# Patient Record
Sex: FEMALE | Race: WHITE | NOT HISPANIC OR LATINO | Employment: FULL TIME | URBAN - METROPOLITAN AREA
[De-identification: names, ages, dates, MRNs, and addresses within clinical notes are randomized per-mention and may not be internally consistent; named-entity substitution may affect disease eponyms.]

---

## 2017-02-27 ENCOUNTER — LAB (OUTPATIENT)
Dept: LAB | Facility: HOSPITAL | Age: 52
End: 2017-02-27

## 2017-02-27 ENCOUNTER — HOSPITAL ENCOUNTER (OUTPATIENT)
Dept: GENERAL RADIOLOGY | Facility: HOSPITAL | Age: 52
Discharge: HOME | End: 2017-02-27
Attending: ORTHOPAEDIC SURGERY

## 2017-02-27 ENCOUNTER — HOSPITAL ENCOUNTER (OUTPATIENT)
Dept: GENERAL RADIOLOGY | Facility: HOSPITAL | Age: 52
Discharge: HOME | End: 2017-02-27
Attending: ORTHOPAEDIC SURGERY | Admitting: ORTHOPAEDIC SURGERY

## 2017-02-27 ENCOUNTER — OFFICE VISIT (OUTPATIENT)
Dept: ORTHOPEDIC SURGERY | Facility: CLINIC | Age: 52
End: 2017-02-27

## 2017-02-27 VITALS — BODY MASS INDEX: 33.66 KG/M2 | HEIGHT: 65 IN | WEIGHT: 202 LBS | TEMPERATURE: 97.9 F

## 2017-02-27 DIAGNOSIS — E55.9 VITAMIN D DEFICIENCY: ICD-10-CM

## 2017-02-27 DIAGNOSIS — T85.848S PAIN FROM IMPLANTED HARDWARE, SEQUELA: ICD-10-CM

## 2017-02-27 DIAGNOSIS — M19.079 ARTHRITIS OF FOOT: ICD-10-CM

## 2017-02-27 DIAGNOSIS — Z98.890 HX OF FOOT SURGERY: ICD-10-CM

## 2017-02-27 DIAGNOSIS — M79.672 FOOT PAIN, LEFT: Primary | ICD-10-CM

## 2017-02-27 DIAGNOSIS — S92.355A CLOSED NONDISPLACED FRACTURE OF FIFTH METATARSAL BONE OF LEFT FOOT, INITIAL ENCOUNTER: ICD-10-CM

## 2017-02-27 PROBLEM — T85.848A PAIN FROM IMPLANTED HARDWARE: Status: ACTIVE | Noted: 2017-02-27

## 2017-02-27 LAB — 25(OH)D3 SERPL-MCNC: 42.9 NG/ML (ref 30–100)

## 2017-02-27 PROCEDURE — 36415 COLL VENOUS BLD VENIPUNCTURE: CPT

## 2017-02-27 PROCEDURE — 73630 X-RAY EXAM OF FOOT: CPT

## 2017-02-27 PROCEDURE — 82306 VITAMIN D 25 HYDROXY: CPT

## 2017-02-27 PROCEDURE — 99214 OFFICE O/P EST MOD 30 MIN: CPT | Performed by: ORTHOPAEDIC SURGERY

## 2017-02-27 PROCEDURE — 28470 CLTX METATARSAL FX WO MNP EA: CPT | Performed by: ORTHOPAEDIC SURGERY

## 2017-02-27 RX ORDER — ALENDRONATE SODIUM 70 MG/1
70 TABLET ORAL
COMMUNITY
Start: 2015-03-17

## 2017-02-27 RX ORDER — BUDESONIDE AND FORMOTEROL FUMARATE DIHYDRATE 80; 4.5 UG/1; UG/1
2 AEROSOL RESPIRATORY (INHALATION)
COMMUNITY
Start: 2015-02-22

## 2017-02-27 RX ORDER — ATORVASTATIN CALCIUM 20 MG/1
20 TABLET, FILM COATED ORAL DAILY
COMMUNITY
Start: 2016-03-19

## 2017-02-27 RX ORDER — MONTELUKAST SODIUM 10 MG/1
10 TABLET ORAL NIGHTLY
COMMUNITY

## 2017-02-27 RX ORDER — ESOMEPRAZOLE MAGNESIUM 40 MG/1
40 CAPSULE, DELAYED RELEASE ORAL
COMMUNITY

## 2017-02-27 RX ORDER — NAPROXEN 500 MG/1
500 TABLET ORAL 2 TIMES DAILY WITH MEALS
COMMUNITY
Start: 2017-02-26 | End: 2017-03-17 | Stop reason: HOSPADM

## 2017-02-27 RX ORDER — ALBUTEROL SULFATE 90 UG/1
2 AEROSOL, METERED RESPIRATORY (INHALATION) EVERY 4 HOURS PRN
COMMUNITY

## 2017-02-27 RX ORDER — UBIDECARENONE 75 MG
50 CAPSULE ORAL DAILY
COMMUNITY

## 2017-02-27 NOTE — PROGRESS NOTES
"   New Patient Complaint      Patient: Tanya L Gowers  YOB: 1965 52 y.o. female  Medical Record Number: [id number]    Chief Complaints: My feet hurt    History of Present Illness:        Patient had ORIF of right fifth metatarsal from February 2014.  Since she's never really been able to move the toe well and is was a little bit of numbness to it but is now began to get some occasional intermittent achy pain over the lateral aspect of the right fifth metatarsal.  However her major new complaint is of 5 week history of moderate constant throbbing aching pain over the lateral aspect of her left foot.  No specific injury but she is on her feet quite a bit.  HPI    Allergies: No Known Allergies    Medications:   No current outpatient prescriptions on file prior to visit.     No current facility-administered medications on file prior to visit.        Past Medical History   Diagnosis Date   • Asthma    • OA (osteoarthritis)      Past Surgical History   Procedure Laterality Date   • Hand surgery Right 2014     tendon repair   • Foot surgery Right 2014     Social History     Occupational History   • Not on file.     Social History Main Topics   • Smoking status: Never Smoker   • Smokeless tobacco: Not on file   • Alcohol use No   • Drug use: Not on file   • Sexual activity: Not on file      Social History     Social History Narrative   • No narrative on file     Family History   Problem Relation Age of Onset   • Diabetes Other    • Cancer Other      skin   • Hypertension Other    • Heart disease Other        Review of Systems: 14 point review of systems performed, positive pertinent findings identified in HPI. All remaining systems negative     Review of Systems      Physical Exam:   Vitals:    02/27/17 0900   Temp: 97.9 °F (36.6 °C)   TempSrc: Temporal Artery    Weight: 202 lb (91.6 kg)   Height: 65\" (165.1 cm)     Physical Exam   Constitutional: pleasant, well developed   Eyes: sclera non icteric  Hearing : " adequate for exam  Cardiovascular: palpable pulses in foot, calf/ thigh NT without sign of DVT  Respiratoy: breathing unlabored   Neurological: grossly sensate to LT throughout LE  Psychiatric: oriented with normal mood and affect.   Lymphatic: No palpable popliteal lymphadenopathy  Skin: intact throughout leg/foot  Musculoskeletal: Right foot shows well-healed incision there is some diminished sensation around the lateral incision limited motion to the fifth toe some mild discomfort over the hardware and metatarsal dorsally more so than laterally but no sign of infection or breakdown.  Left foot shows neutral alignment without significant varus.  There is mild swelling and discomfort to palpation along the dorsal lateral aspect of the fifth metatarsal.  But also some discomfort over the dorsolateral midfoot along the base the third and fourth metatarsals.  Physical Exam  Ortho Exam    Radiology: 3 views of the left foot ordered to evaluate foot pain and reviewed there are no prior x-rays available for comparison.  Shows arthritic change mildly to the dorsum of the midfoot.  There is also a nondisplaced fifth metatarsal metaphyseal diaphyseal stress fracture.    3 views of the right foot were ordered to evaluate foot pain reviewed and compared with x-rays from 2014 hardware remains intact and best I can tell fracture remains well-healed is a little questionable area of lucency around the plate distally.    Assessment/Plan: 1.  Probable vitamin D deficiency.  She had a level checked that I could check and Epic several years ago that was at the very low end of normal she takes D2 weekly but is unclear of the dose.  We need to check another vitamin D level and monitor and tailor dose accordingly.    2.  Right fifth metatarsal pain status post ORIF.  Have her use stiff accommodative shoes.  We'll try to get a CT scan to make sure this is healed about may be difficult to tell with the hardware intact.    3.  Left fifth  metatarsal fracture with dorsal midfoot arthritis.  We'll place her into a boot and crutches.  We discussed operative and nonoperative measures she certainly like to try some nonoperative measures which I think is reasonable.  She'll use crutches limit weightbearing will allow seated work only and also any get an MRI to make sure it on something else going on the dorsum of the foot or other stress fractures etc.  I'll see her back in about 2 weeks x-rays of her left foot and right foot if she's having persistent pain.    Also as she was leaving she said that she has been having pain in her right knee.  We'll get an x-ray of her right knee at follow-up.

## 2017-02-28 ENCOUNTER — TELEPHONE (OUTPATIENT)
Dept: ORTHOPEDIC SURGERY | Facility: CLINIC | Age: 52
End: 2017-02-28

## 2017-02-28 DIAGNOSIS — E55.9 VITAMIN D INSUFFICIENCY: Primary | ICD-10-CM

## 2017-02-28 NOTE — TELEPHONE ENCOUNTER
----- Message from Dane Yip MD sent at 2/27/2017 11:53 AM EST -----  Please have her do D3 50k weekly and recheck in 6 weeks  ----- Message -----     From: Lab, Background User     Sent: 2/27/2017  11:37 AM       To: Dane Yip MD

## 2017-02-28 NOTE — TELEPHONE ENCOUNTER
Have spoken with the patient regarding her serum vitamin D level.  It was 42.9.  Patient is presently on vitamin D to 50,000 units once a week that his ordered by her primary care physician.  Have ask her to stay on the same dose and will recheck her serum vitamin D level of the week of April 10 per Dr. Yip

## 2017-03-03 ENCOUNTER — HOSPITAL ENCOUNTER (OUTPATIENT)
Dept: CT IMAGING | Facility: HOSPITAL | Age: 52
Discharge: HOME | End: 2017-03-03
Attending: ORTHOPAEDIC SURGERY | Admitting: ORTHOPAEDIC SURGERY

## 2017-03-03 ENCOUNTER — OFFICE VISIT (OUTPATIENT)
Dept: ORTHOPEDIC SURGERY | Facility: CLINIC | Age: 52
End: 2017-03-03

## 2017-03-03 DIAGNOSIS — T85.848S PAIN FROM IMPLANTED HARDWARE, SEQUELA: ICD-10-CM

## 2017-03-03 DIAGNOSIS — S92.355A CLOSED NONDISPLACED FRACTURE OF FIFTH METATARSAL BONE OF LEFT FOOT, INITIAL ENCOUNTER: ICD-10-CM

## 2017-03-03 PROCEDURE — 73718 MRI LOWER EXTREMITY W/O DYE: CPT | Performed by: ORTHOPAEDIC SURGERY

## 2017-03-03 PROCEDURE — 73700 CT LOWER EXTREMITY W/O DYE: CPT

## 2017-03-13 ENCOUNTER — OFFICE VISIT (OUTPATIENT)
Dept: ORTHOPEDIC SURGERY | Facility: CLINIC | Age: 52
End: 2017-03-13

## 2017-03-13 VITALS — HEIGHT: 65 IN | TEMPERATURE: 97.6 F | BODY MASS INDEX: 33.66 KG/M2 | WEIGHT: 202 LBS

## 2017-03-13 DIAGNOSIS — S92.355G CLOSED NONDISPLACED FRACTURE OF FIFTH METATARSAL BONE OF LEFT FOOT WITH DELAYED HEALING, SUBSEQUENT ENCOUNTER: ICD-10-CM

## 2017-03-13 DIAGNOSIS — M17.11 OSTEOARTHROSIS, LOCALIZED, PRIMARY, KNEE, RIGHT: ICD-10-CM

## 2017-03-13 DIAGNOSIS — T85.848S PAIN FROM IMPLANTED HARDWARE, SEQUELA: Primary | ICD-10-CM

## 2017-03-13 PROCEDURE — 20610 DRAIN/INJ JOINT/BURSA W/O US: CPT | Performed by: ORTHOPAEDIC SURGERY

## 2017-03-13 PROCEDURE — 99214 OFFICE O/P EST MOD 30 MIN: CPT | Performed by: ORTHOPAEDIC SURGERY

## 2017-03-13 PROCEDURE — 73630 X-RAY EXAM OF FOOT: CPT | Performed by: ORTHOPAEDIC SURGERY

## 2017-03-13 PROCEDURE — 73562 X-RAY EXAM OF KNEE 3: CPT | Performed by: ORTHOPAEDIC SURGERY

## 2017-03-13 RX ORDER — BUPIVACAINE HCL/0.9 % NACL/PF 0.1 %
3 PLASTIC BAG, INJECTION (ML) EPIDURAL ONCE
Status: CANCELLED | OUTPATIENT
Start: 2017-03-13 | End: 2017-03-13

## 2017-03-13 RX ORDER — SODIUM CHLORIDE 0.9 % (FLUSH) 0.9 %
1-10 SYRINGE (ML) INJECTION AS NEEDED
Status: CANCELLED | OUTPATIENT
Start: 2017-03-13

## 2017-03-13 RX ADMIN — LIDOCAINE HYDROCHLORIDE 2 ML: 10 INJECTION, SOLUTION INFILTRATION; PERINEURAL at 13:24

## 2017-03-13 RX ADMIN — METHYLPREDNISOLONE ACETATE 80 MG: 80 INJECTION, SUSPENSION INTRA-ARTICULAR; INTRALESIONAL; INTRAMUSCULAR; SOFT TISSUE at 13:24

## 2017-03-13 RX ADMIN — BUPIVACAINE HYDROCHLORIDE 2 ML: 5 INJECTION, SOLUTION PERINEURAL at 13:24

## 2017-03-13 NOTE — PROGRESS NOTES
"Foot Follow Up      Patient: Tanya L Gowers    YOB: 1965 52 y.o. female    Chief Complaints: Foot and knee hurt    History of Present Illness: Follows up about a 6 week history of moderate constant aching throbbing pain over the lateral aspect of her left foot.  Since her last visit she has been in a boot using a cane with minimal relief.  She also complains of intermittent achy pain over the lateral aspect of the right fifth metatarsal where she had ORIF done in February 2015.  She also now reports a 4-5 week history of moderate intermittent throbbing aching pain over the medial aspect of her right knee since she's been putting more weight on it subsequent to her left foot and in.  HPI    ROS: Foot pain him a knee pain, no numbness or tingling  Past Medical History   Diagnosis Date   • Asthma    • OA (osteoarthritis)      Physical Exam:   Vitals:    03/13/17 1408   Temp: 97.6 °F (36.4 °C)   TempSrc: Temporal Artery    Weight: 202 lb (91.6 kg)   Height: 65\" (165.1 cm)     Well developed with normal mood.  Right knee shows mild effusion no warmth erythema.  There is discomfort over the medial joint line but no gross exacerbation by Jonelle's.  0-115° range of motion stable ligamentous exam.  Right foot shows well-healed incision over the lateral aspect of the fifth metatarsal no warmth or erythema minimal discomfort palpation along the prominent hardware.  Left foot shows moderate discomfort palpation along the lateral aspect of the fifth metatarsal with neutral dorsiflexion.  Grossly sensate light touch at the lateral aspect of the left foot      Radiology: 3 views of both feet were ordered today to evaluate foot pain reviewed and compared with x-rays from her last visit.  The left fifth metatarsal metaphyseal diaphyseal stress fracture is more prominent today but not displaced.  Right foot shows hardware to remain intact with no clear evidence of recurrent fracture.  3 views of the right knee were " ordered to evaluate knee pain and reviewed there are no prior x-rays for comparison.  These show moderate arthritic changes medial joint space narrowing no obvious fracture or gross malalignment.    CT scan of the right foot films and report dated 3/3/17 were reviewed shows the plate and screw fixation but no obvious new fracture.  There is mild degenerative change of the dorsum of the second TMT joint    MRI films and report of left foot dated 3/3/17 showed some bone marrow edema and soft tissue edema around nondisplaced fifth metatarsal fracture some degenerative change at the third TMT joint.    Vit D 2/27/17 42.9    Assessment/Plan:  1.  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  Nothing really to do with this point for these may eventually need radiographically guided injections    2.  Vitamin D level is within the normal limit of .  She is currently taking 50,000 units weekly per her PCP and will continue with this    3.  Right foot symptomatic hardware.  This is only mildly bothersome at this time and she does not want to have it removed.  We will monitor this and she'll continue with wide accommodative shoes    4.  Right knee pain most likely symptomatically arthritis from overuse to the left foot condition.  We discussed treatment options and right knee was injected with steroid.  Over this will help settle things down if not may need MRI.    5.  Left fifth metaphyseal diaphyseal metatarsal stress fracture.  We discussed operative and nonoperative treatment options and although no guarantees could be given she like to proceed with operative treatment.  She will need intramedullary screw fixation and may need augmentation with cadaver BMP.  I reviewed the procedure with her in detail and postoperative course with associated risks benefits potential outcomes and complications which can include but are not limited to heart attack stroke death pneumonia infection bleeding damage to blood  vessels and nerves or tendons blood clots pulmonary and wasn't persistent or worsening pain stiffness malunion nonunion need for subsequent surgery and failure of fixation or hardware or fracture distal to the implant. As well as small risk of disease transmission from cadaver graft.  All of her questions are answered to her full satisfaction will schedule this as soon as possible on an outpatient basis.  She will call she has any questions prior to surgery      Large Joint Arthrocentesis  Date/Time: 3/13/2017 1:24 PM  Consent given by: patient  Site marked: site marked  Timeout: Immediately prior to procedure a time out was called to verify the correct patient, procedure, equipment, support staff and site/side marked as required   Supporting Documentation  Indications: pain   Procedure Details  Location: knee - R knee  Needle size: 22 G  Approach: anteromedial  Medications administered: 2 mL lidocaine 1 %; 80 mg methylPREDNISolone acetate 80 MG/ML; 2 mL bupivacaine  Patient tolerance: patient tolerated the procedure well with no immediate complications

## 2017-03-14 ENCOUNTER — PREP FOR SURGERY (OUTPATIENT)
Dept: ORTHOPEDIC SURGERY | Facility: CLINIC | Age: 52
End: 2017-03-14

## 2017-03-14 ENCOUNTER — APPOINTMENT (OUTPATIENT)
Dept: PREADMISSION TESTING | Facility: HOSPITAL | Age: 52
End: 2017-03-14

## 2017-03-14 ENCOUNTER — TELEPHONE (OUTPATIENT)
Dept: ORTHOPEDIC SURGERY | Facility: CLINIC | Age: 52
End: 2017-03-14

## 2017-03-14 VITALS
WEIGHT: 204 LBS | OXYGEN SATURATION: 94 % | TEMPERATURE: 97.8 F | RESPIRATION RATE: 16 BRPM | HEART RATE: 89 BPM | HEIGHT: 65 IN | SYSTOLIC BLOOD PRESSURE: 153 MMHG | DIASTOLIC BLOOD PRESSURE: 90 MMHG | BODY MASS INDEX: 33.99 KG/M2

## 2017-03-14 DIAGNOSIS — D72.829 LEUKOCYTOSIS, UNSPECIFIED TYPE: Primary | ICD-10-CM

## 2017-03-14 LAB
ANION GAP SERPL CALCULATED.3IONS-SCNC: 19.4 MMOL/L
BUN BLD-MCNC: 13 MG/DL (ref 6–20)
BUN/CREAT SERPL: 19.7 (ref 7–25)
CALCIUM SPEC-SCNC: 9.9 MG/DL (ref 8.6–10.5)
CHLORIDE SERPL-SCNC: 99 MMOL/L (ref 98–107)
CO2 SERPL-SCNC: 21.6 MMOL/L (ref 22–29)
CREAT BLD-MCNC: 0.66 MG/DL (ref 0.57–1)
DEPRECATED RDW RBC AUTO: 41.9 FL (ref 37–54)
ERYTHROCYTE [DISTWIDTH] IN BLOOD BY AUTOMATED COUNT: 13.7 % (ref 11.7–13)
GFR SERPL CREATININE-BSD FRML MDRD: 93 ML/MIN/1.73
GLUCOSE BLD-MCNC: 133 MG/DL (ref 65–99)
HCT VFR BLD AUTO: 39.9 % (ref 35.6–45.5)
HGB BLD-MCNC: 13.6 G/DL (ref 11.9–15.5)
MCH RBC QN AUTO: 28.8 PG (ref 26.9–32)
MCHC RBC AUTO-ENTMCNC: 34.1 G/DL (ref 32.4–36.3)
MCV RBC AUTO: 84.5 FL (ref 80.5–98.2)
PLATELET # BLD AUTO: 237 10*3/MM3 (ref 140–500)
PMV BLD AUTO: 11.1 FL (ref 6–12)
POTASSIUM BLD-SCNC: 3.8 MMOL/L (ref 3.5–5.2)
RBC # BLD AUTO: 4.72 10*6/MM3 (ref 3.9–5.2)
SODIUM BLD-SCNC: 140 MMOL/L (ref 136–145)
WBC NRBC COR # BLD: 22.72 10*3/MM3 (ref 4.5–10.7)

## 2017-03-14 PROCEDURE — 93010 ELECTROCARDIOGRAM REPORT: CPT | Performed by: INTERNAL MEDICINE

## 2017-03-14 PROCEDURE — 36415 COLL VENOUS BLD VENIPUNCTURE: CPT

## 2017-03-14 PROCEDURE — 93005 ELECTROCARDIOGRAM TRACING: CPT

## 2017-03-14 PROCEDURE — 85027 COMPLETE CBC AUTOMATED: CPT | Performed by: ORTHOPAEDIC SURGERY

## 2017-03-14 PROCEDURE — 80048 BASIC METABOLIC PNL TOTAL CA: CPT | Performed by: ORTHOPAEDIC SURGERY

## 2017-03-14 RX ORDER — ERGOCALCIFEROL 1.25 MG/1
50000 CAPSULE ORAL WEEKLY
COMMUNITY

## 2017-03-14 RX ORDER — FLUTICASONE PROPIONATE 50 MCG
2 SPRAY, SUSPENSION (ML) NASAL DAILY PRN
COMMUNITY

## 2017-03-14 NOTE — DISCHARGE INSTRUCTIONS
Take the following medications the morning of surgery with a small sip of water.  NEXIUM, SYMBICORT    Arrive to hospital on your day of surgery at 7:30 am    General Instructions:  • Do not eat or drink after midnight: includes water, mints, or gum. You may brush your teeth.  • Do not smoke, chew tobacco, or drink alcohol.  • The Pre-Admission Testing nurse will instruct you to bring medications if unable to obtain an accurate list in Pre-Admission Testing.    • If applicable bring your C-PAP/ BI-PAP machine.  • Bring any papers given to you in the doctor’s office.  • Wear clean comfortable clothes and socks.  • Do not wear contact lenses or make-up.  Bring a case for your glasses if applicable.   • Bring crutches or walker if applicable.  • Leave all other valuables and jewelry at home.    If you were given a blood bank ID arm band remember to bring it with you the day of surgery.    Preventing a Surgical Site Infection:  Shower on the morning of surgery using a fresh bar of anti-bacterial soap (such as Dial) and clean washcloth.  Dry with a clean towel and dress in clean clothing.  For 2 to 3 days before surgery, avoid shaving with a razor near where you will have surgery because the razor can irritate skin and make it easier to develop an infection  Ask your surgeon if you will be receiving antibiotics prior to surgery  Make sure you, your family, and all healthcare providers clean their hands with soap and water or an alcohol based hand  before caring for you or your wound  If at all possible, quit smoking as many days before surgery as you can.    Day of surgery:  Upon arrival, a Pre-op nurse and Anesthesiologist will review your health history, obtain vital signs, and answer questions you may have.  The only belongings needed at this time will be your home medications and if applicable your C-PAP/BI-PAP machine.  If you are staying overnight your family can leave the rest of your belongings in the  car and bring them to your room later.  A Pre-op nurse will start an IV and you may receive medication in preparation for surgery, including something to help you relax.  Your family will be able to see you in the Pre-op area.  While you are in surgery your family should notify the waiting room  if they leave the waiting room area and provide a contact phone number.    Please be aware that surgery does come with discomfort.  We want to make every effort to control your discomfort so please discuss any uncontrolled symptoms with your nurse.   Your doctor will most likely have prescribed pain medications.      If you are going home after surgery you will receive individualized written care instructions before being discharged.  A responsible adult must drive you to and from the hospital on the day of your surgery and stay with you for 24 hours.    If you are staying overnight following surgery, you will be transported to your hospital room following the recovery period.  [facility] has all private rooms.    If you have any questions please call Pre-Admission Testing at [phone].  Deductibles and co-payments are collected on the day of service. Please be prepared to pay the required co-pay, deductible or deposit on the day of service as defined by your plan.

## 2017-03-16 NOTE — H&P
"      Patient: Tanya L Gowers     YOB: 1965 52 y.o. female     Chief Complaints: Foot and knee hurt     History of Present Illness: Follows up about a 6 week history of moderate constant aching throbbing pain over the lateral aspect of her left foot. Since her last visit she has been in a boot using a cane with minimal relief. She also complains of intermittent achy pain over the lateral aspect of the right fifth metatarsal where she had ORIF done in February 2015. She also now reports a 4-5 week history of moderate intermittent throbbing aching pain over the medial aspect of her right knee since she's been putting more weight on it subsequent to her left foot and in.  HPI     ROS: Foot pain him a knee pain, no numbness or tingling   Medical History         Past Medical History   Diagnosis Date   • Asthma     • OA (osteoarthritis)           Physical Exam:    Vitals        Vitals:     03/13/17 1408   Temp: 97.6 °F (36.4 °C)   TempSrc: Temporal Artery    Weight: 202 lb (91.6 kg)   Height: 65\" (165.1 cm)         Well developed with normal mood. Right knee shows mild effusion no warmth erythema. There is discomfort over the medial joint line but no gross exacerbation by Jonelle's. 0-115° range of motion stable ligamentous exam. Right foot shows well-healed incision over the lateral aspect of the fifth metatarsal no warmth or erythema minimal discomfort palpation along the prominent hardware. Left foot shows moderate discomfort palpation along the lateral aspect of the fifth metatarsal with neutral dorsiflexion. Grossly sensate light touch at the lateral aspect of the left foot        Radiology: 3 views of both feet were ordered today to evaluate foot pain reviewed and compared with x-rays from her last visit. The left fifth metatarsal metaphyseal diaphyseal stress fracture is more prominent today but not displaced. Right foot shows hardware to remain intact with no clear evidence of recurrent fracture. " Right knee shows moderate arthritic changes medial joint space narrowing no obvious fracture or gross malalignment.     CT scan of the right foot films and report dated 3/3/17 were reviewed shows the plate and screw fixation but no obvious new fracture. There is mild degenerative change of the dorsum of the second TMT joint     MRI films and report of left foot dated 3/3/17 showed some bone marrow edema and soft tissue edema around nondisplaced fifth metatarsal fracture some degenerative change at the third TMT joint.     Vit D 2/27/17 42.9     Assessment/Plan: 1. 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 Nothing really to do with this point for these may eventually need radiographically guided injections     2.  Vitamin D level is within the normal limit of . She is currently taking 50,000 units weekly per her PCP and will continue with this     3. Right foot symptomatic hardware. This is only mildly bothersome at this time and she does not want to have it removed. We will monitor this and she'll continue with wide accommodative shoes     4. Right knee pain most likely symptomatically arthritis from overuse to the left foot condition. We discussed treatment options and right knee was injected with steroid. Over this will help settle things down if not may need MRI.     5. Left fifth metaphyseal diaphyseal metatarsal stress fracture. We discussed operative and nonoperative treatment options and although no guarantees could be given she like to proceed with operative treatment. She will need intramedullary screw fixation and may need augmentation with cadaver BMP. I reviewed the procedure with her in detail and postoperative course with associated risks benefits potential outcomes and complications which can include but are not limited to heart attack stroke death pneumonia infection bleeding damage to blood vessels and nerves or tendons blood clots pulmonary and wasn't persistent or worsening  pain stiffness malunion nonunion need for subsequent surgery and failure of fixation or hardware or fracture distal to the implant. As well as small risk of disease transmission from cadaver graft.  All of her questions are answered to her full satisfaction will schedule this as soon as possible on an outpatient basis. She will call she has any questions prior to surgery

## 2017-03-17 ENCOUNTER — HOSPITAL ENCOUNTER (OUTPATIENT)
Facility: HOSPITAL | Age: 52
Setting detail: HOSPITAL OUTPATIENT SURGERY
Discharge: HOME | End: 2017-03-17
Attending: ORTHOPAEDIC SURGERY | Admitting: ORTHOPAEDIC SURGERY

## 2017-03-17 ENCOUNTER — ANESTHESIA EVENT (OUTPATIENT)
Dept: PERIOP | Facility: HOSPITAL | Age: 52
End: 2017-03-17

## 2017-03-17 ENCOUNTER — ANESTHESIA (OUTPATIENT)
Dept: PERIOP | Facility: HOSPITAL | Age: 52
End: 2017-03-17

## 2017-03-17 ENCOUNTER — APPOINTMENT (OUTPATIENT)
Dept: GENERAL RADIOLOGY | Facility: HOSPITAL | Age: 52
End: 2017-03-17

## 2017-03-17 VITALS
HEART RATE: 75 BPM | DIASTOLIC BLOOD PRESSURE: 87 MMHG | OXYGEN SATURATION: 95 % | RESPIRATION RATE: 16 BRPM | SYSTOLIC BLOOD PRESSURE: 155 MMHG | TEMPERATURE: 99 F

## 2017-03-17 DIAGNOSIS — D72.829 LEUKOCYTOSIS, UNSPECIFIED TYPE: ICD-10-CM

## 2017-03-17 DIAGNOSIS — S92.355G CLOSED NONDISPLACED FRACTURE OF FIFTH METATARSAL BONE OF LEFT FOOT WITH DELAYED HEALING, SUBSEQUENT ENCOUNTER: ICD-10-CM

## 2017-03-17 LAB
BASOPHILS # BLD AUTO: 0.04 10*3/MM3 (ref 0–0.2)
BASOPHILS NFR BLD AUTO: 0.4 % (ref 0–1.5)
DEPRECATED RDW RBC AUTO: 43.9 FL (ref 37–54)
EOSINOPHIL # BLD AUTO: 0.1 10*3/MM3 (ref 0–0.7)
EOSINOPHIL NFR BLD AUTO: 1.1 % (ref 0.3–6.2)
ERYTHROCYTE [DISTWIDTH] IN BLOOD BY AUTOMATED COUNT: 13.9 % (ref 11.7–13)
HCT VFR BLD AUTO: 43 % (ref 35.6–45.5)
HGB BLD-MCNC: 14 G/DL (ref 11.9–15.5)
IMM GRANULOCYTES # BLD: 0.03 10*3/MM3 (ref 0–0.03)
IMM GRANULOCYTES NFR BLD: 0.3 % (ref 0–0.5)
LYMPHOCYTES # BLD AUTO: 3.14 10*3/MM3 (ref 0.9–4.8)
LYMPHOCYTES NFR BLD AUTO: 34.8 % (ref 19.6–45.3)
MCH RBC QN AUTO: 28.2 PG (ref 26.9–32)
MCHC RBC AUTO-ENTMCNC: 32.6 G/DL (ref 32.4–36.3)
MCV RBC AUTO: 86.5 FL (ref 80.5–98.2)
MONOCYTES # BLD AUTO: 0.55 10*3/MM3 (ref 0.2–1.2)
MONOCYTES NFR BLD AUTO: 6.1 % (ref 5–12)
NEUTROPHILS # BLD AUTO: 5.17 10*3/MM3 (ref 1.9–8.1)
NEUTROPHILS NFR BLD AUTO: 57.3 % (ref 42.7–76)
PLATELET # BLD AUTO: 239 10*3/MM3 (ref 140–500)
PMV BLD AUTO: 10.8 FL (ref 6–12)
RBC # BLD AUTO: 4.97 10*6/MM3 (ref 3.9–5.2)
WBC NRBC COR # BLD: 9.03 10*3/MM3 (ref 4.5–10.7)

## 2017-03-17 PROCEDURE — 25010000002 ROPIVACAINE PER 1 MG: Performed by: ANESTHESIOLOGY

## 2017-03-17 PROCEDURE — 28322 REPAIR OF METATARSALS: CPT | Performed by: ORTHOPAEDIC SURGERY

## 2017-03-17 PROCEDURE — 73630 X-RAY EXAM OF FOOT: CPT

## 2017-03-17 PROCEDURE — C1713 ANCHOR/SCREW BN/BN,TIS/BN: HCPCS | Performed by: ORTHOPAEDIC SURGERY

## 2017-03-17 PROCEDURE — 76000 FLUOROSCOPY <1 HR PHYS/QHP: CPT

## 2017-03-17 PROCEDURE — 25010000002 MIDAZOLAM PER 1 MG: Performed by: ANESTHESIOLOGY

## 2017-03-17 PROCEDURE — 25010000002 PROPOFOL 10 MG/ML EMULSION: Performed by: NURSE ANESTHETIST, CERTIFIED REGISTERED

## 2017-03-17 PROCEDURE — 25010000002 FENTANYL CITRATE (PF) 100 MCG/2ML SOLUTION: Performed by: ANESTHESIOLOGY

## 2017-03-17 PROCEDURE — 85025 COMPLETE CBC W/AUTO DIFF WBC: CPT | Performed by: ORTHOPAEDIC SURGERY

## 2017-03-17 PROCEDURE — 25010000002 PROMETHAZINE PER 50 MG: Performed by: ANESTHESIOLOGY

## 2017-03-17 PROCEDURE — 25010000002 ONDANSETRON PER 1 MG: Performed by: NURSE ANESTHETIST, CERTIFIED REGISTERED

## 2017-03-17 PROCEDURE — 25010000002 DEXAMETHASONE PER 1 MG: Performed by: ANESTHESIOLOGY

## 2017-03-17 DEVICE — IMPLANTABLE DEVICE: Type: IMPLANTABLE DEVICE | Site: FOOT | Status: FUNCTIONAL

## 2017-03-17 RX ORDER — DIPHENHYDRAMINE HYDROCHLORIDE 50 MG/ML
6.25 INJECTION INTRAMUSCULAR; INTRAVENOUS
Status: DISCONTINUED | OUTPATIENT
Start: 2017-03-17 | End: 2017-03-17 | Stop reason: HOSPADM

## 2017-03-17 RX ORDER — MIDAZOLAM HYDROCHLORIDE 1 MG/ML
2 INJECTION INTRAMUSCULAR; INTRAVENOUS
Status: DISCONTINUED | OUTPATIENT
Start: 2017-03-17 | End: 2017-03-17 | Stop reason: HOSPADM

## 2017-03-17 RX ORDER — OXYCODONE HYDROCHLORIDE AND ACETAMINOPHEN 5; 325 MG/1; MG/1
1 TABLET ORAL ONCE AS NEEDED
Status: DISCONTINUED | OUTPATIENT
Start: 2017-03-17 | End: 2017-03-17 | Stop reason: HOSPADM

## 2017-03-17 RX ORDER — ONDANSETRON 2 MG/ML
4 INJECTION INTRAMUSCULAR; INTRAVENOUS ONCE AS NEEDED
Status: DISCONTINUED | OUTPATIENT
Start: 2017-03-17 | End: 2017-03-17 | Stop reason: HOSPADM

## 2017-03-17 RX ORDER — CEPHALEXIN 500 MG/1
500 CAPSULE ORAL EVERY 6 HOURS
Qty: 8 CAPSULE | Refills: 0 | Status: SHIPPED | OUTPATIENT
Start: 2017-03-17 | End: 2017-03-19

## 2017-03-17 RX ORDER — FAMOTIDINE 10 MG/ML
20 INJECTION, SOLUTION INTRAVENOUS ONCE
Status: COMPLETED | OUTPATIENT
Start: 2017-03-17 | End: 2017-03-17

## 2017-03-17 RX ORDER — FENTANYL CITRATE 50 UG/ML
100 INJECTION, SOLUTION INTRAMUSCULAR; INTRAVENOUS
Status: DISCONTINUED | OUTPATIENT
Start: 2017-03-17 | End: 2017-03-17 | Stop reason: HOSPADM

## 2017-03-17 RX ORDER — ROPIVACAINE HYDROCHLORIDE 5 MG/ML
INJECTION, SOLUTION EPIDURAL; INFILTRATION; PERINEURAL AS NEEDED
Status: DISCONTINUED | OUTPATIENT
Start: 2017-03-17 | End: 2017-03-17 | Stop reason: SURG

## 2017-03-17 RX ORDER — PROMETHAZINE HYDROCHLORIDE 25 MG/ML
6.25 INJECTION, SOLUTION INTRAMUSCULAR; INTRAVENOUS ONCE
Status: COMPLETED | OUTPATIENT
Start: 2017-03-17 | End: 2017-03-17

## 2017-03-17 RX ORDER — PROPOFOL 10 MG/ML
VIAL (ML) INTRAVENOUS AS NEEDED
Status: DISCONTINUED | OUTPATIENT
Start: 2017-03-17 | End: 2017-03-17 | Stop reason: SURG

## 2017-03-17 RX ORDER — HYDRALAZINE HYDROCHLORIDE 20 MG/ML
5 INJECTION INTRAMUSCULAR; INTRAVENOUS
Status: DISCONTINUED | OUTPATIENT
Start: 2017-03-17 | End: 2017-03-17 | Stop reason: HOSPADM

## 2017-03-17 RX ORDER — FENTANYL CITRATE 50 UG/ML
50 INJECTION, SOLUTION INTRAMUSCULAR; INTRAVENOUS
Status: DISCONTINUED | OUTPATIENT
Start: 2017-03-17 | End: 2017-03-17 | Stop reason: HOSPADM

## 2017-03-17 RX ORDER — SODIUM CHLORIDE, SODIUM LACTATE, POTASSIUM CHLORIDE, CALCIUM CHLORIDE 600; 310; 30; 20 MG/100ML; MG/100ML; MG/100ML; MG/100ML
9 INJECTION, SOLUTION INTRAVENOUS CONTINUOUS
Status: DISCONTINUED | OUTPATIENT
Start: 2017-03-17 | End: 2017-03-17 | Stop reason: HOSPADM

## 2017-03-17 RX ORDER — HYDROCODONE BITARTRATE AND ACETAMINOPHEN 5; 325 MG/1; MG/1
1 TABLET ORAL ONCE AS NEEDED
Status: DISCONTINUED | OUTPATIENT
Start: 2017-03-17 | End: 2017-03-17 | Stop reason: HOSPADM

## 2017-03-17 RX ORDER — CEFAZOLIN SODIUM IN 0.9 % NACL 3 G/100 ML
INTRAVENOUS SOLUTION, PIGGYBACK (ML) INTRAVENOUS
Status: DISCONTINUED
Start: 2017-03-17 | End: 2017-03-17 | Stop reason: HOSPADM

## 2017-03-17 RX ORDER — BUPIVACAINE HCL/0.9 % NACL/PF 0.1 %
3 PLASTIC BAG, INJECTION (ML) EPIDURAL ONCE
Status: COMPLETED | OUTPATIENT
Start: 2017-03-17 | End: 2017-03-17

## 2017-03-17 RX ORDER — ONDANSETRON 2 MG/ML
INJECTION INTRAMUSCULAR; INTRAVENOUS AS NEEDED
Status: DISCONTINUED | OUTPATIENT
Start: 2017-03-17 | End: 2017-03-17 | Stop reason: SURG

## 2017-03-17 RX ORDER — LIDOCAINE HYDROCHLORIDE 10 MG/ML
5 INJECTION, SOLUTION EPIDURAL; INFILTRATION; INTRACAUDAL; PERINEURAL ONCE AS NEEDED
Status: DISCONTINUED | OUTPATIENT
Start: 2017-03-17 | End: 2017-03-17 | Stop reason: HOSPADM

## 2017-03-17 RX ORDER — CEFAZOLIN SODIUM 2 G/100ML
INJECTION, SOLUTION INTRAVENOUS
Status: DISCONTINUED
Start: 2017-03-17 | End: 2017-03-17 | Stop reason: WASHOUT

## 2017-03-17 RX ORDER — SCOLOPAMINE TRANSDERMAL SYSTEM 1 MG/1
1 PATCH, EXTENDED RELEASE TRANSDERMAL ONCE
Status: DISCONTINUED | OUTPATIENT
Start: 2017-03-17 | End: 2017-03-17 | Stop reason: HOSPADM

## 2017-03-17 RX ORDER — SODIUM CHLORIDE 9 MG/ML
INJECTION, SOLUTION INTRAVENOUS CONTINUOUS PRN
Status: DISCONTINUED | OUTPATIENT
Start: 2017-03-17 | End: 2017-03-17 | Stop reason: SURG

## 2017-03-17 RX ORDER — OXYCODONE HYDROCHLORIDE AND ACETAMINOPHEN 5; 325 MG/1; MG/1
1-2 TABLET ORAL EVERY 4 HOURS PRN
Qty: 80 TABLET | Refills: 0 | Status: SHIPPED | OUTPATIENT
Start: 2017-03-17 | End: 2017-10-26

## 2017-03-17 RX ORDER — OXYCODONE HYDROCHLORIDE AND ACETAMINOPHEN 5; 325 MG/1; MG/1
2 TABLET ORAL ONCE AS NEEDED
Status: DISCONTINUED | OUTPATIENT
Start: 2017-03-17 | End: 2017-03-17 | Stop reason: HOSPADM

## 2017-03-17 RX ORDER — LIDOCAINE HYDROCHLORIDE 20 MG/ML
INJECTION, SOLUTION INFILTRATION; PERINEURAL AS NEEDED
Status: DISCONTINUED | OUTPATIENT
Start: 2017-03-17 | End: 2017-03-17 | Stop reason: SURG

## 2017-03-17 RX ORDER — CEFAZOLIN SODIUM IN 0.9 % NACL 3 G/100 ML
3 INTRAVENOUS SOLUTION, PIGGYBACK (ML) INTRAVENOUS ONCE
Status: DISCONTINUED | OUTPATIENT
Start: 2017-03-17 | End: 2017-03-17 | Stop reason: HOSPADM

## 2017-03-17 RX ORDER — HYDROMORPHONE HYDROCHLORIDE 1 MG/ML
0.25 INJECTION, SOLUTION INTRAMUSCULAR; INTRAVENOUS; SUBCUTANEOUS
Status: DISCONTINUED | OUTPATIENT
Start: 2017-03-17 | End: 2017-03-17 | Stop reason: HOSPADM

## 2017-03-17 RX ORDER — MAGNESIUM HYDROXIDE 1200 MG/15ML
LIQUID ORAL AS NEEDED
Status: DISCONTINUED | OUTPATIENT
Start: 2017-03-17 | End: 2017-03-17 | Stop reason: HOSPADM

## 2017-03-17 RX ORDER — SODIUM CHLORIDE 0.9 % (FLUSH) 0.9 %
1-10 SYRINGE (ML) INJECTION AS NEEDED
Status: DISCONTINUED | OUTPATIENT
Start: 2017-03-17 | End: 2017-03-17 | Stop reason: HOSPADM

## 2017-03-17 RX ORDER — LABETALOL HYDROCHLORIDE 5 MG/ML
5 INJECTION, SOLUTION INTRAVENOUS
Status: DISCONTINUED | OUTPATIENT
Start: 2017-03-17 | End: 2017-03-17 | Stop reason: HOSPADM

## 2017-03-17 RX ORDER — DEXAMETHASONE SODIUM PHOSPHATE 4 MG/ML
INJECTION, SOLUTION INTRA-ARTICULAR; INTRALESIONAL; INTRAMUSCULAR; INTRAVENOUS; SOFT TISSUE AS NEEDED
Status: DISCONTINUED | OUTPATIENT
Start: 2017-03-17 | End: 2017-03-17 | Stop reason: SURG

## 2017-03-17 RX ORDER — MIDAZOLAM HYDROCHLORIDE 1 MG/ML
1 INJECTION INTRAMUSCULAR; INTRAVENOUS
Status: DISCONTINUED | OUTPATIENT
Start: 2017-03-17 | End: 2017-03-17 | Stop reason: HOSPADM

## 2017-03-17 RX ORDER — ACETAMINOPHEN 650 MG
TABLET, EXTENDED RELEASE ORAL AS NEEDED
Status: DISCONTINUED | OUTPATIENT
Start: 2017-03-17 | End: 2017-03-17 | Stop reason: HOSPADM

## 2017-03-17 RX ADMIN — SODIUM CHLORIDE: 9 INJECTION, SOLUTION INTRAVENOUS at 10:43

## 2017-03-17 RX ADMIN — PROMETHAZINE HYDROCHLORIDE 6.25 MG: 25 INJECTION, SOLUTION INTRAMUSCULAR; INTRAVENOUS at 08:39

## 2017-03-17 RX ADMIN — ROPIVACAINE HYDROCHLORIDE 25 ML: 5 INJECTION, SOLUTION EPIDURAL; INFILTRATION; PERINEURAL at 08:56

## 2017-03-17 RX ADMIN — LIDOCAINE HYDROCHLORIDE 40 MG: 20 INJECTION, SOLUTION INFILTRATION; PERINEURAL at 09:46

## 2017-03-17 RX ADMIN — PROPOFOL 250 MG: 10 INJECTION, EMULSION INTRAVENOUS at 09:46

## 2017-03-17 RX ADMIN — CEFAZOLIN 3 G: 1 INJECTION, POWDER, FOR SOLUTION INTRAMUSCULAR; INTRAVENOUS; PARENTERAL at 09:46

## 2017-03-17 RX ADMIN — FENTANYL CITRATE 25 MCG: 50 INJECTION INTRAMUSCULAR; INTRAVENOUS at 09:51

## 2017-03-17 RX ADMIN — SODIUM CHLORIDE, POTASSIUM CHLORIDE, SODIUM LACTATE AND CALCIUM CHLORIDE 9 ML/HR: 600; 310; 30; 20 INJECTION, SOLUTION INTRAVENOUS at 11:15

## 2017-03-17 RX ADMIN — DEXAMETHASONE SODIUM PHOSPHATE 4 MG: 4 INJECTION, SOLUTION INTRAMUSCULAR; INTRAVENOUS at 08:56

## 2017-03-17 RX ADMIN — ONDANSETRON 4 MG: 2 INJECTION INTRAMUSCULAR; INTRAVENOUS at 09:53

## 2017-03-17 RX ADMIN — FAMOTIDINE 20 MG: 10 INJECTION, SOLUTION INTRAVENOUS at 08:39

## 2017-03-17 RX ADMIN — FENTANYL CITRATE 50 MCG: 50 INJECTION INTRAMUSCULAR; INTRAVENOUS at 08:43

## 2017-03-17 RX ADMIN — SODIUM CHLORIDE, POTASSIUM CHLORIDE, SODIUM LACTATE AND CALCIUM CHLORIDE 9 ML/HR: 600; 310; 30; 20 INJECTION, SOLUTION INTRAVENOUS at 08:38

## 2017-03-17 RX ADMIN — MIDAZOLAM 2 MG: 1 INJECTION INTRAMUSCULAR; INTRAVENOUS at 08:43

## 2017-03-17 RX ADMIN — SCOPALAMINE 1 PATCH: 1 PATCH, EXTENDED RELEASE TRANSDERMAL at 08:38

## 2017-03-17 NOTE — ANESTHESIA PROCEDURE NOTES
Airway  Urgency: elective    Airway not difficult    General Information and Staff    Patient location during procedure: OR  Anesthesiologist: JAZMYNE ADAMS  CRNA: FABBY BYRD    Indications and Patient Condition  Indications for airway management: airway protection    Preoxygenated: yes  Mask difficulty assessment: 1 - vent by mask    Final Airway Details  Final airway type: supraglottic airway      Successful airway: unique  Size 4    Number of attempts at approach: 1    Additional Comments  Smooth IV/mask induction and placement of LMA. Atraumatic, lips/teeth/mouth intact, as preop. +ETCO2, bilateral breath sounds and equal.

## 2017-03-17 NOTE — ANESTHESIA PROCEDURE NOTES
Peripheral Block    Patient location during procedure: holding area  Start time: 3/17/2017 8:42 AM  Stop time: 3/17/2017 8:56 AM  Reason for block: at surgeon's request and post-op pain management  Performed by  Anesthesiologist: JAZMYNE ADAMS  Preanesthetic Checklist  Completed: patient identified, site marked, surgical consent, pre-op evaluation, timeout performed, IV checked, risks and benefits discussed and monitors and equipment checked  Peripheral Block Prep:  Sterile barriers:cap, gloves, mask and sterile barriers  Prep: ChloraPrep  Patient monitoring: blood pressure monitoring, continuous pulse oximetry and EKG  Peripheral Procedure  Sedation:yes  Guidance:ultrasound guided  Images:still images obtained    Laterality:left  Block Type:adductor canal block, popliteal and sciatic  Injection Technique:single-shot  Needle Type:echogenic  Needle Gauge:22 G  ULTRASOUND INTERPRETATION.  Using ultrasound guidance a 22 G gauge needle was placed in close proximity to the femoral and sciatic nerve, at which point, under ultrasound guidance anesthetic was injected in the area of the nerve and spread of the anesthesia was seen on ultrasound in close proximity thereto.  There were no abnormalities seen on ultrasound; a digital image was taken; and the patient tolerated the procedure with no complications.   Medications  Local Injected:ropivacaine 0.5% without epinephrine Local Amount Injected:30mL  Post Assessment  Injection Assessment: negative aspiration for heme, no paresthesia on injection and incremental injection  Patient Tolerance:comfortable throughout block  Complications:no  Additional Notes  Sciatic - rop 15cc /  dex 4mg  Fem ( add canal ) - rop 10cc

## 2017-03-17 NOTE — INTERVAL H&P NOTE
H&P updated. The patient was examined and no clinical sign of infection. WBC improved and was likely due to steroid injection. 3mm abrasion left anterior shin from boot. appeares healed, no redness

## 2017-03-17 NOTE — PLAN OF CARE
Problem: Patient Care Overview (Adult)  Goal: Plan of Care Review  Outcome: Ongoing (interventions implemented as appropriate)    03/17/17 0855   Coping/Psychosocial Response Interventions   Plan Of Care Reviewed With patient   Patient Care Overview   Progress improving       Goal: Adult Individualization and Mutuality  Outcome: Ongoing (interventions implemented as appropriate)  Goal: Discharge Needs Assessment  Outcome: Ongoing (interventions implemented as appropriate)    03/17/17 0855   Discharge Needs Assessment   Concerns To Be Addressed no discharge needs identified

## 2017-03-17 NOTE — PLAN OF CARE
Problem: Perioperative Period (Adult)  Goal: Signs and Symptoms of Listed Potential Problems Will be Absent or Manageable (Perioperative Period)  Outcome: Ongoing (interventions implemented as appropriate)    03/17/17 1118   Perioperative Period   Problems Assessed (Perioperative Period) all   Problems Present (Perioperative Period) none

## 2017-03-17 NOTE — PLAN OF CARE
Problem: Patient Care Overview (Adult)  Goal: Plan of Care Review  Outcome: Ongoing (interventions implemented as appropriate)    03/17/17 1118   Coping/Psychosocial Response Interventions   Plan Of Care Reviewed With patient   Patient Care Overview   Progress improving   Outcome Evaluation   Outcome Summary/Follow up Plan Vital signs stable, denies pain, tking po fluids       Goal: Adult Individualization and Mutuality  Outcome: Ongoing (interventions implemented as appropriate)  Goal: Discharge Needs Assessment  Outcome: Ongoing (interventions implemented as appropriate)    03/17/17 1118   Discharge Needs Assessment   Concerns To Be Addressed denies needs/concerns at this time

## 2017-03-17 NOTE — BRIEF OP NOTE
FOOT OPEN REDUCTION INTERNAL FIXATION  Procedure Note    Gretel Hernandezrenee  3/17/2017    Pre-op Diagnosis:   Closed nondisplaced fracture of fifth metatarsal bone of left foot with delayed healing, subsequent encounter [S92.355G]    Post-op Diagnosis:     Post-Op Diagnosis Codes:     * Closed nondisplaced fracture of fifth metatarsal bone of left foot with delayed healing, subsequent encounter [S92.355G]    Procedure/CPT® Codes:      Procedure(s):  METATARSAL OPEN REDUCTION INTERNAL FIXATION with screw and cadaver graft  left foot    Surgeon(s):  Dane Yip MD    Anesthesia: General    Staff:   Circulator: Quynh Alonso RN  Scrub Person: Erika Seymour  Vendor Representative: Israel Costello    Estimated Blood Loss: 10 ml  Urine Voided: * No values recorded between 3/17/2017  9:38 AM and 3/17/2017 10:53 AM *    Specimens:                none      Drains: none              Complications: none      Dane Yip MD     Date: 3/17/2017  Time: 10:54 AM

## 2017-03-17 NOTE — OP NOTE
Date of Procedure:  03/17/2017    PREOPERATIVE DIAGNOSIS: Left 5th metatarsal stress fracture without union.     POSTOPERATIVE DIAGNOSIS: Left 5th metatarsal stress fracture without union.     PROCEDURE PERFORMED: Intramedullary fixation left 5th metatarsal stress fracture using Arthrex 4.5 x 45 mm partially threaded solid 5th metatarsal screw and Arthrex bone morphogenic protein injectable gel.     SURGEON: Dane Yip MD     ASSISTANT: None.     ANESTHESIA: General LMA.     TOURNIQUET TIME: None used.     ESTIMATED BLOOD LOSS: Less than 10 mL.     DISPOSITION: Taken to recovery room in stable condition.     INDICATIONS FOR PROCEDURE:  The patient has had a history of progressive pain over the lateral aspect of the left 5th metatarsal. It has been unimproved with conservative treatment. X-rays show some hypertrophic area around the fracture site, but union is not present. She presents now for operative fixation.     DESCRIPTION OF PROCEDURE: Preoperative informed consent and anesthesia evaluation were obtained. IV antibiotics were administered. Surgical site was marked. Blocks administered by anesthesia. She was brought to the operating room and placed in a supine position. Anesthesia was induced. LMA was positioned. Well-padded tourniquet was placed on the left distal leg, but was never inflated. The left foot was prepped and draped in a sterile fashion. A surgical timeout was performed. I delineated the base of the 5th metatarsal and also delineated the trajectory of the 5th metatarsal shaft on the AP, oblique, and lateral views. I then made an approximately 3 cm incision over the lateral aspect of the foot extending proximally from the base of the 5th metatarsal. Blunt dissection was then carried down through subcutaneous and soft tissues with care taken to avoid the branches of the sural nerve or the peroneal tendons. Base of the 5th metatarsal was identified and cleared. It was quite tight with the  cuboid. I was then able to get a guidewire into the base of the 5th metatarsal and properly aligned on all 3 views such that it passed the fracture site and was contained distally as much as was necessary to get screws across this. This was a very difficult angle of trajectory due to her morphology. I did note that the as the wire passed through the area of fracture that it was quite sclerotic. After I confirmed that the guidewire was contained on all 3 views, I then power reamed over this with a 35 mm reamer vigorously reaming this through the fracture site to help auto bone graft it. Care taken not to penetrate distally. Following this, I then tapped with a 45 tap taking this very slowly. There was nice fit and torqued distal to the fracture site with no evidence of iatrogenic fracture distally. I then irrigated the wound and then injected the intramedullary canal with Arthrex BMP putty. I then placed the screw under x-ray guidance.  This was well-contained on all 3 views with no evidence of iatrogenic fracture. Screws were well past the fracture site. There did appear to be some compression at the fracture site. The wound was again irrigated.  It was hemostatic and was closed with 4-0 Vicryl and 4-0 nylon. Forefoot and ankle well-padded dressing was then applied. She was awakened, transferred to stretcher, and taken to recovery room in stable condition.        Dane Yip M.D.  RACHELLE:rivas  D:   03/17/2017 11:16:25  T:   03/17/2017 14:36:13  Job ID:   [id number]  Document ID:   [id number]  cc:

## 2017-03-17 NOTE — PLAN OF CARE
Problem: Perioperative Period (Adult)  Goal: Signs and Symptoms of Listed Potential Problems Will be Absent or Manageable (Perioperative Period)  Outcome: Ongoing (interventions implemented as appropriate)    03/17/17 0855   Perioperative Period   Problems Assessed (Perioperative Period) all   Problems Present (Perioperative Period) none

## 2017-03-20 ENCOUNTER — TRANSCRIBE ORDERS (OUTPATIENT)
Dept: ADMINISTRATIVE | Facility: HOSPITAL | Age: 52
End: 2017-03-20

## 2017-03-20 ENCOUNTER — TELEPHONE (OUTPATIENT)
Dept: ORTHOPEDIC SURGERY | Facility: CLINIC | Age: 52
End: 2017-03-20

## 2017-03-20 ENCOUNTER — HOSPITAL ENCOUNTER (OUTPATIENT)
Dept: CARDIOLOGY | Facility: HOSPITAL | Age: 52
Discharge: HOME | End: 2017-03-20
Attending: ORTHOPAEDIC SURGERY | Admitting: ORTHOPAEDIC SURGERY

## 2017-03-20 DIAGNOSIS — M79.605 LEG PAIN, POSTERIOR, LEFT: ICD-10-CM

## 2017-03-20 DIAGNOSIS — M79.605 LEG PAIN, POSTERIOR, LEFT: Primary | ICD-10-CM

## 2017-03-20 LAB
BH CV LOWER VASCULAR LEFT COMMON FEMORAL AUGMENT: NORMAL
BH CV LOWER VASCULAR LEFT COMMON FEMORAL COMPETENT: NORMAL
BH CV LOWER VASCULAR LEFT COMMON FEMORAL COMPRESS: NORMAL
BH CV LOWER VASCULAR LEFT COMMON FEMORAL PHASIC: NORMAL
BH CV LOWER VASCULAR LEFT COMMON FEMORAL SPONT: NORMAL
BH CV LOWER VASCULAR LEFT DISTAL FEMORAL COMPRESS: NORMAL
BH CV LOWER VASCULAR LEFT GASTRONEMIUS COMPRESS: NORMAL
BH CV LOWER VASCULAR LEFT GREATER SAPH AK COMPRESS: NORMAL
BH CV LOWER VASCULAR LEFT GREATER SAPH BK COMPRESS: NORMAL
BH CV LOWER VASCULAR LEFT MID FEMORAL AUGMENT: NORMAL
BH CV LOWER VASCULAR LEFT MID FEMORAL COMPETENT: NORMAL
BH CV LOWER VASCULAR LEFT MID FEMORAL COMPRESS: NORMAL
BH CV LOWER VASCULAR LEFT MID FEMORAL PHASIC: NORMAL
BH CV LOWER VASCULAR LEFT MID FEMORAL SPONT: NORMAL
BH CV LOWER VASCULAR LEFT PERONEAL COMPRESS: NORMAL
BH CV LOWER VASCULAR LEFT POPLITEAL AUGMENT: NORMAL
BH CV LOWER VASCULAR LEFT POPLITEAL COMPETENT: NORMAL
BH CV LOWER VASCULAR LEFT POPLITEAL COMPRESS: NORMAL
BH CV LOWER VASCULAR LEFT POPLITEAL PHASIC: NORMAL
BH CV LOWER VASCULAR LEFT POPLITEAL SPONT: NORMAL
BH CV LOWER VASCULAR LEFT POSTERIOR TIBIAL COMPRESS: NORMAL
BH CV LOWER VASCULAR LEFT PROXIMAL FEMORAL COMPRESS: NORMAL
BH CV LOWER VASCULAR LEFT SAPHENOFEMORAL JUNCTION AUGMENT: NORMAL
BH CV LOWER VASCULAR LEFT SAPHENOFEMORAL JUNCTION COMPETENT: NORMAL
BH CV LOWER VASCULAR LEFT SAPHENOFEMORAL JUNCTION COMPRESS: NORMAL
BH CV LOWER VASCULAR LEFT SAPHENOFEMORAL JUNCTION PHASIC: NORMAL
BH CV LOWER VASCULAR LEFT SAPHENOFEMORAL JUNCTION SPONT: NORMAL
BH CV LOWER VASCULAR RIGHT COMMON FEMORAL AUGMENT: NORMAL
BH CV LOWER VASCULAR RIGHT COMMON FEMORAL COMPETENT: NORMAL
BH CV LOWER VASCULAR RIGHT COMMON FEMORAL COMPRESS: NORMAL
BH CV LOWER VASCULAR RIGHT COMMON FEMORAL PHASIC: NORMAL
BH CV LOWER VASCULAR RIGHT COMMON FEMORAL SPONT: NORMAL

## 2017-03-20 PROCEDURE — 93971 EXTREMITY STUDY: CPT

## 2017-03-20 NOTE — TELEPHONE ENCOUNTER
I spoke with patient she had fifth metatarsal screw fixation on 3/17/17.  Said her foot was still numb and can barely move her toes.  This is probably residual from her block.  She was complaining of some pain in the posterior lateral aspect of her left lower leg and did a feeling of tightness.  She's been getting up to the bathroom but otherwise has been relatively sedentary keeping it elevated.  We'll check a Doppler on her and see her later this week.  Counseled her she may loosen her bandages if necessary

## 2017-03-24 ENCOUNTER — OFFICE VISIT (OUTPATIENT)
Dept: ORTHOPEDIC SURGERY | Facility: CLINIC | Age: 52
End: 2017-03-24

## 2017-03-24 VITALS — HEIGHT: 65 IN | TEMPERATURE: 98.6 F | WEIGHT: 200 LBS | BODY MASS INDEX: 33.32 KG/M2

## 2017-03-24 DIAGNOSIS — T85.848D PAIN FROM IMPLANTED HARDWARE, SUBSEQUENT ENCOUNTER: ICD-10-CM

## 2017-03-24 DIAGNOSIS — S92.355S CLOSED NONDISPLACED FRACTURE OF FIFTH METATARSAL BONE OF LEFT FOOT, SEQUELA: Primary | ICD-10-CM

## 2017-03-24 DIAGNOSIS — M17.11 OSTEOARTHROSIS, LOCALIZED, PRIMARY, KNEE, RIGHT: ICD-10-CM

## 2017-03-24 PROCEDURE — 99213 OFFICE O/P EST LOW 20 MIN: CPT | Performed by: ORTHOPAEDIC SURGERY

## 2017-03-24 PROCEDURE — 73630 X-RAY EXAM OF FOOT: CPT | Performed by: ORTHOPAEDIC SURGERY

## 2017-03-27 PROBLEM — M17.10 OSTEOARTHROSIS, LOCALIZED, PRIMARY, KNEE: Status: ACTIVE | Noted: 2017-03-27

## 2017-03-27 RX ORDER — METHYLPREDNISOLONE ACETATE 80 MG/ML
80 INJECTION, SUSPENSION INTRA-ARTICULAR; INTRALESIONAL; INTRAMUSCULAR; SOFT TISSUE
Status: COMPLETED | OUTPATIENT
Start: 2017-03-13 | End: 2017-03-13

## 2017-03-27 RX ORDER — LIDOCAINE HYDROCHLORIDE 10 MG/ML
2 INJECTION, SOLUTION INFILTRATION; PERINEURAL
Status: COMPLETED | OUTPATIENT
Start: 2017-03-13 | End: 2017-03-13

## 2017-03-27 RX ORDER — BUPIVACAINE HYDROCHLORIDE 5 MG/ML
2 INJECTION, SOLUTION PERINEURAL
Status: COMPLETED | OUTPATIENT
Start: 2017-03-13 | End: 2017-03-13

## 2017-03-27 NOTE — PROGRESS NOTES
"Foot Follow Up      Patient: Tanya L Gowers    YOB: 1965 52 y.o. female    Chief Complaints: Foot doing okay    History of Present Illness: Follows up 1 week out now from intramedullary fixation of her left fifth metatarsal.  She is also been seen for symptomatic hardware her right foot and right knee arthritis she was injected at her last visit with only mild intermittent achy pain in the right foot and knee which is somewhat improved.  Still has some numbness in the left foot.  She had called about earlier in the week.  She also had some calf cramping but Doppler was unremarkable for DVT.  Really no significant complaints regarding pain is now off her pain medications.  HPI    ROS: Foot pain  numbness tingling no chest pain or shortness of breath   Past Medical History:   Diagnosis Date   • Asthma    • GERD (gastroesophageal reflux disease)    • High cholesterol    • Metatarsal fracture     LEFT FOOT   • OA (osteoarthritis)    • Osteoporosis    • PONV (postoperative nausea and vomiting)      Physical Exam:   Vitals:    03/24/17 1502   Temp: 98.6 °F (37 °C)   Weight: 200 lb (90.7 kg)   Height: 65\" (165.1 cm)     Well developed with normal mood.  Left foot incision is healing without sign of infection only mild swelling.  Left calf is nontender without sign of DVT.  Right foot shows minimal discomfort along the fifth metatarsal but no warmth erythema or significant bursal formation.  Right knee was without sign of infection warmth or erythema.  0-115° range of motion with only mild discomfort over the medial joint line.  She did have diminished sensation throughout the left foot but no sign of hyperesthesia.  Sensation was grossly intact to light touch.      Radiology: 3 views of left foot were ordered to evaluate postoperative alignment reviewed and compared with preoperative views.  There is good containment of the screw across the fracture site with no evidence of iatrogenic fracture.  Is well " contained    Doppler left lower extremity 3/20/17 negative for DVT    Assessment/Plan:  1.  Status post left fifth metatarsal ORIF.  Sutures removed Steri-Strips are applied she may let this get wet in the shower we'll have her with elevation she may do touch down weightbearing for a week may then progress to partial weightbearing for 2 weeks with the boot that she will he has.  Hopefully her neuritic symptoms will improve over time.  I'll see her back in 3 weeks' x-rays of her left foot.    Retained hardware right foot only mildly symptomatic at this time so that nothing to do at this time for this other than continue with padding and wide accommodative shoes.    Right knee arthritic pain seems to be somewhat improved by use of the injection.  She will limit activity as much as possible continue with range of motion.

## 2017-04-04 ENCOUNTER — TELEPHONE (OUTPATIENT)
Dept: ORTHOPEDIC SURGERY | Facility: CLINIC | Age: 52
End: 2017-04-04

## 2017-04-04 NOTE — TELEPHONE ENCOUNTER
I spoke with patient she has a persistent feeling of numbness in the lateral side of her foot.  It was improving to a certain degree and then stopped.  It has not gotten worse.  She also complains of some feeling of tightness in her arch was tingling when she first stands up after sitting for prolonged period she really only been using her boot for ambulation and has stopped using her Ace wrap.  Counseled her is not unusual for nerve have some stretch or swelling to it may take quite some time to improve thank you nothing seems be worsening.  I recommended that she continue use of her boot for ambulation only otherwise use nothing constrictive about the foot or ankle and to do some heel cord and arch stretching exercises that described for her prior to first standing.  I will see her back as scheduled all of her questions are answered to her full satisfaction

## 2017-04-13 ENCOUNTER — OFFICE VISIT (OUTPATIENT)
Dept: ORTHOPEDIC SURGERY | Facility: CLINIC | Age: 52
End: 2017-04-13

## 2017-04-13 VITALS — HEIGHT: 65 IN | WEIGHT: 200 LBS | TEMPERATURE: 97.4 F | BODY MASS INDEX: 33.32 KG/M2

## 2017-04-13 DIAGNOSIS — S92.355D CLOSED NONDISPLACED FRACTURE OF FIFTH METATARSAL BONE OF LEFT FOOT WITH ROUTINE HEALING, SUBSEQUENT ENCOUNTER: Primary | ICD-10-CM

## 2017-04-13 DIAGNOSIS — T85.848D PAIN FROM IMPLANTED HARDWARE, SUBSEQUENT ENCOUNTER: ICD-10-CM

## 2017-04-13 DIAGNOSIS — M17.11 OSTEOARTHROSIS, LOCALIZED, PRIMARY, KNEE, RIGHT: ICD-10-CM

## 2017-04-13 DIAGNOSIS — Z98.890 S/P ORIF (OPEN REDUCTION INTERNAL FIXATION) FRACTURE: ICD-10-CM

## 2017-04-13 DIAGNOSIS — Z87.81 S/P ORIF (OPEN REDUCTION INTERNAL FIXATION) FRACTURE: ICD-10-CM

## 2017-04-13 PROCEDURE — 73630 X-RAY EXAM OF FOOT: CPT | Performed by: ORTHOPAEDIC SURGERY

## 2017-04-13 PROCEDURE — 99213 OFFICE O/P EST LOW 20 MIN: CPT | Performed by: ORTHOPAEDIC SURGERY

## 2017-04-13 NOTE — PROGRESS NOTES
"Foot Follow Up      Patient: Tanya L Gowers    YOB: 1965 52 y.o. female    Chief Complaints: Foot doing okay    History of Present Illness: Follows up left fifth metatarsal fixation from 3/17/17.  Since our visit on 3/24/17 she's been touchdown weightbearing.  She's also been seen for right knee arthritis with previous injection she said it is feeling some better now that she's been putting weight on her left foot.  She's also been seen for right foot symptomatic hardware which she says is now feeling much much better.  Still has some feeling of numbness in her left foot  HPI    ROS: Foot pain, numbness  Past Medical History:   Diagnosis Date   • Asthma    • GERD (gastroesophageal reflux disease)    • High cholesterol    • Metatarsal fracture     LEFT FOOT   • OA (osteoarthritis)    • Osteoporosis    • PONV (postoperative nausea and vomiting)      Physical Exam:   Vitals:    04/13/17 1414   Temp: 97.4 °F (36.3 °C)   Weight: 200 lb (90.7 kg)   Height: 65\" (165.1 cm)     Well developed with normal mood.Right knee showed minimal discomfort palpation over the medial joint line with 0-115° range of motion.  Right foot showed no focal tenderness over the fifth metatarsal today.  Left foot showed incision is healed nicely with no warmth or erythema.  There is diminished sensation on the lateral aspect of the foot but grossly sensate light touch dorsally medially.      Radiology: 3 views of the left foot were ordered to evaluate fracture alignment reviewed and compared with x-rays from her last visit.  Hardware remains intact there may be some slight interval callus formation.      Assessment/Plan: 1.  Status post ORIF left fifth metatarsal.  Patient may progress with one crutch weightbearing for a week may then come off the crutches altogether as her pain allows but continue with her boot.  I will see her back in 2 weeks' x-rays of her left foot in the interim she'll check to see if she can go back to work in " her boot as hopefully can get her back to work at that time.  Counseled her that I expect her neuritic symptoms will improve over time nothing to do but observe this for now.    2.  Vitamin D level was 42.9 back in late February.  She continues to take supplemental vitamin D and is monitored to her PCP for this.    3.  Right knee arthritis seems be improving will continue to improve as she is able to put more weight on her left foot.    4. Right foot symptomatically hardware this does not seem to bother some at this time if symptoms recur we may consider hardware removal.    Follow-up 2 weeks' x-rays left foot

## 2017-04-26 ENCOUNTER — OFFICE VISIT (OUTPATIENT)
Dept: ORTHOPEDIC SURGERY | Facility: CLINIC | Age: 52
End: 2017-04-26

## 2017-04-26 VITALS — HEIGHT: 65 IN | TEMPERATURE: 97.9 F | BODY MASS INDEX: 35.16 KG/M2 | WEIGHT: 211 LBS

## 2017-04-26 DIAGNOSIS — S92.355D CLOSED NONDISPLACED FRACTURE OF FIFTH METATARSAL BONE OF LEFT FOOT WITH ROUTINE HEALING, SUBSEQUENT ENCOUNTER: Primary | ICD-10-CM

## 2017-04-26 DIAGNOSIS — M79.675 PAIN OF TOE OF LEFT FOOT: ICD-10-CM

## 2017-04-26 DIAGNOSIS — M17.11 OSTEOARTHROSIS, LOCALIZED, PRIMARY, KNEE, RIGHT: ICD-10-CM

## 2017-04-26 PROCEDURE — 73630 X-RAY EXAM OF FOOT: CPT | Performed by: ORTHOPAEDIC SURGERY

## 2017-04-26 PROCEDURE — 99024 POSTOP FOLLOW-UP VISIT: CPT | Performed by: ORTHOPAEDIC SURGERY

## 2017-04-27 NOTE — PROGRESS NOTES
"Foot Follow Up      Patient: Tanya L Gowers    YOB: 1965 52 y.o. female    Chief Complaints: Foot feeling some better    History of Present Illness: Left fifth metatarsal fixation 3/17/17.  Since her last visit she has been doing full weightbearing in her boot and occasionally going to the bathroom without any shoes on with only some very mild intermittent soreness.  She has reported some intermittent discomfort under the plantar distal aspect of her left second toe when she uses her boot.  Still has some numbness in the foot but it does seem to be improving.  Her right knee pain is improving with only some intermittent achiness but no mechanical symptoms.  HPI    ROS: Foot pain no chest pain or shortness of breath  Past Medical History:   Diagnosis Date   • Asthma    • GERD (gastroesophageal reflux disease)    • High cholesterol    • Metatarsal fracture     LEFT FOOT   • OA (osteoarthritis)    • Osteoporosis    • PONV (postoperative nausea and vomiting)      Physical Exam:   Vitals:    04/26/17 1026   Temp: 97.9 °F (36.6 °C)   Weight: 211 lb (95.7 kg)   Height: 65\" (165.1 cm)     Well developed with normal mood.Left foot incision is healed very nicely she'll is small amount of dried skin around this no warmth erythema or drainage minimal discomfort along the fifth metatarsal.  Right knee shows some mild discomfort globally to palpation but no focal pain with Jonelle's.  0-115° range of motion.  Left second toe shows no warmth erythema or swelling there is some mild discomfort under the pulp in the plantar aspect of the distal toe but no ulceration or breakdown reaches grossly sensate light touch throughout the foot but had some mild decrease in sensation over the dorsal and plantar aspects of the foot.      Radiology: 3 views of the left foot were ordered to evaluate postoperative alignment reviewed and compared with x-rays from last visit.  Hardware remains intact with no evidence of fracture around " this.  There is increased callus formation at the fracture site.      Assessment/Plan: 1.  Status post ORIF left fifth metatarsal.  She may progress out of her boot but recommended that she use athletic shoes at all times for some support.  We'll allow her to return to work in her boot as her pain allows.  We'll continue to monitor her neuritic symptoms.    Vitamin D was 42.9 in late February she will continue with supplement vitamin D monitored to her PCP.    3.  Right knee arthritis seems to continue to improve as she is putting more weight on her left foot.  If she has any escalation in symptoms or develops mechanical problems may require MRI.    Right foot previously symptomatic hardware is not terribly bothersome at this time we'll continue to monitor.  Follow-up in 3 weeks' x-rays left foot.

## 2017-05-18 ENCOUNTER — OFFICE VISIT (OUTPATIENT)
Dept: ORTHOPEDIC SURGERY | Facility: CLINIC | Age: 52
End: 2017-05-18

## 2017-05-18 VITALS — HEIGHT: 65 IN | BODY MASS INDEX: 34.99 KG/M2 | TEMPERATURE: 97.7 F | WEIGHT: 210 LBS

## 2017-05-18 DIAGNOSIS — Z87.81 S/P ORIF (OPEN REDUCTION INTERNAL FIXATION) FRACTURE: Primary | ICD-10-CM

## 2017-05-18 DIAGNOSIS — M17.11 OSTEOARTHROSIS, LOCALIZED, PRIMARY, KNEE, RIGHT: ICD-10-CM

## 2017-05-18 DIAGNOSIS — S92.355D CLOSED NONDISPLACED FRACTURE OF FIFTH METATARSAL BONE OF LEFT FOOT WITH ROUTINE HEALING, SUBSEQUENT ENCOUNTER: ICD-10-CM

## 2017-05-18 DIAGNOSIS — Z98.890 S/P ORIF (OPEN REDUCTION INTERNAL FIXATION) FRACTURE: Primary | ICD-10-CM

## 2017-05-18 DIAGNOSIS — M72.2 PLANTAR FASCIITIS: ICD-10-CM

## 2017-05-18 PROCEDURE — 73630 X-RAY EXAM OF FOOT: CPT | Performed by: ORTHOPAEDIC SURGERY

## 2017-05-18 PROCEDURE — 99024 POSTOP FOLLOW-UP VISIT: CPT | Performed by: ORTHOPAEDIC SURGERY

## 2017-06-22 ENCOUNTER — OFFICE VISIT (OUTPATIENT)
Dept: ORTHOPEDIC SURGERY | Facility: CLINIC | Age: 52
End: 2017-06-22

## 2017-06-22 VITALS — WEIGHT: 210 LBS | BODY MASS INDEX: 34.99 KG/M2 | TEMPERATURE: 97.6 F | HEIGHT: 65 IN

## 2017-06-22 DIAGNOSIS — Z98.890 S/P ORIF (OPEN REDUCTION INTERNAL FIXATION) FRACTURE: Primary | ICD-10-CM

## 2017-06-22 DIAGNOSIS — M72.2 PLANTAR FASCIITIS: ICD-10-CM

## 2017-06-22 DIAGNOSIS — Z87.81 S/P ORIF (OPEN REDUCTION INTERNAL FIXATION) FRACTURE: Primary | ICD-10-CM

## 2017-06-22 DIAGNOSIS — M79.672 HEEL PAIN, CHRONIC, LEFT: ICD-10-CM

## 2017-06-22 DIAGNOSIS — E55.9 VITAMIN D DEFICIENCY: ICD-10-CM

## 2017-06-22 DIAGNOSIS — G89.29 HEEL PAIN, CHRONIC, LEFT: ICD-10-CM

## 2017-06-22 DIAGNOSIS — G57.92 NEURITIS OF FOOT, LEFT: ICD-10-CM

## 2017-06-22 DIAGNOSIS — S92.355D CLOSED NONDISPLACED FRACTURE OF FIFTH METATARSAL BONE OF LEFT FOOT WITH ROUTINE HEALING, SUBSEQUENT ENCOUNTER: ICD-10-CM

## 2017-06-22 PROBLEM — M79.673 HEEL PAIN, CHRONIC: Status: ACTIVE | Noted: 2017-06-22

## 2017-06-22 PROBLEM — G57.90 NEURITIS OF FOOT: Status: ACTIVE | Noted: 2017-06-22

## 2017-06-22 PROCEDURE — 99214 OFFICE O/P EST MOD 30 MIN: CPT | Performed by: ORTHOPAEDIC SURGERY

## 2017-06-22 PROCEDURE — 73630 X-RAY EXAM OF FOOT: CPT | Performed by: ORTHOPAEDIC SURGERY

## 2017-06-23 NOTE — PROGRESS NOTES
"Foot Follow Up      Patient: Tanya L Gowers    YOB: 1965 52 y.o. female    Chief Complaints: Foot hurts    History of Present Illness: Follows up left fifth metatarsal intramedullary fixation for chronic stress fracture from 3/17/17.  At her last visit she had only mild complaints of pain and has progressed with weightbearing and athletic shoes.  She reports increase in moderate constant aching pain along the lateral aspect of the left fifth metatarsal.    She is also seen for left foot plantar fasciitis has been doing her stretching exercises and ice bottle massage has persistent complaints of constant throbbing aching pain in the left heel worse with prolonged weightbearing.    She also does still have some numbness in the lateral aspect of her left foot that has not changed.      :HPI    ROS: Foot pain, numbness  Past Medical History:   Diagnosis Date   • Asthma    • GERD (gastroesophageal reflux disease)    • High cholesterol    • Metatarsal fracture     LEFT FOOT   • OA (osteoarthritis)    • Osteoporosis    • PONV (postoperative nausea and vomiting)      Physical Exam:   Vitals:    06/22/17 1553   Temp: 97.6 °F (36.4 °C)   TempSrc: Temporal Artery    Weight: 210 lb (95.3 kg)   Height: 65\" (165.1 cm)     Well developed with normal mood.Nonantalgic gait.  Discomfort palpation at the insertion of the plantar fascia but also significant pain with medial lateral calcaneal compression.  Neutral dorsiflexion a heel cord.  Left foot incision is well-healed over the base the fifth metatarsal there is only minimal swelling of moderate discomfort palpation along the fifth metatarsal.  She does have some diminished sensation over the lateral aspect of the foot but no mottling or hyperesthesia.  She's grossly sensate light touch       Radiology:3 views of the left foot were ordered to evaluate pain reviewed and compared with x-rays from her last visit.  Hardware appears to be well contained and there does " appear to be some increased callus formation although there is still some faint fracture line.  There is on one view questionable area near the tip of the screw over the medial cortex do not appreciate clear evidence of fracture       Assessment/Plan:  1.  Persistent left fifth metatarsal pain status post ORIF.  I don't see clear evidence of a new fracture distal to the screw but this could be involving.  There also may be incomplete union of the fifth metatarsal fracture more proximally.  We will have her get back into her boot for now and will get a CT scan of her foot for further evaluation of this.    2.  Left foot persistent heel pain I worry that she may have an evolving stress fracture of her calcaneus.  She will limit weightbearing as pain allows.  We need to get an MRI of her left hindfoot to evaluate for calcaneal stress fracture.    3.  Left foot neuritis.  This should improve over time if not may need compounding cream    4.  Regarding her vitamin D that is still being monitored by her primary care physician he was in the low 40s when we checked it  back in February.    5.  Right knee arthritis she did not discuss this today    She understands to call to schedule follow-up appointment once MRI and CT scans have been scheduled.

## 2017-06-29 ENCOUNTER — HOSPITAL ENCOUNTER (OUTPATIENT)
Dept: CT IMAGING | Facility: HOSPITAL | Age: 52
Discharge: HOME | End: 2017-06-29
Attending: ORTHOPAEDIC SURGERY | Admitting: ORTHOPAEDIC SURGERY

## 2017-06-29 DIAGNOSIS — S92.355D CLOSED NONDISPLACED FRACTURE OF FIFTH METATARSAL BONE OF LEFT FOOT WITH ROUTINE HEALING, SUBSEQUENT ENCOUNTER: ICD-10-CM

## 2017-06-29 PROCEDURE — 73700 CT LOWER EXTREMITY W/O DYE: CPT

## 2017-07-03 ENCOUNTER — OFFICE VISIT (OUTPATIENT)
Dept: ORTHOPEDIC SURGERY | Facility: CLINIC | Age: 52
End: 2017-07-03

## 2017-07-03 VITALS — HEIGHT: 65 IN | BODY MASS INDEX: 34.16 KG/M2 | WEIGHT: 205 LBS | TEMPERATURE: 97.8 F

## 2017-07-03 DIAGNOSIS — S92.355D CLOSED NONDISPLACED FRACTURE OF FIFTH METATARSAL BONE OF LEFT FOOT WITH ROUTINE HEALING, SUBSEQUENT ENCOUNTER: ICD-10-CM

## 2017-07-03 DIAGNOSIS — M79.672 HEEL PAIN, CHRONIC, LEFT: ICD-10-CM

## 2017-07-03 DIAGNOSIS — G89.29 HEEL PAIN, CHRONIC, LEFT: ICD-10-CM

## 2017-07-03 DIAGNOSIS — G57.92 NEURITIS OF FOOT, LEFT: ICD-10-CM

## 2017-07-03 DIAGNOSIS — E55.9 VITAMIN D DEFICIENCY: Primary | ICD-10-CM

## 2017-07-03 DIAGNOSIS — M72.2 PLANTAR FASCIITIS: ICD-10-CM

## 2017-07-03 PROCEDURE — 99214 OFFICE O/P EST MOD 30 MIN: CPT | Performed by: ORTHOPAEDIC SURGERY

## 2017-07-03 NOTE — PROGRESS NOTES
"Foot Follow Up      Patient: Tanya L Gowers    YOB: 1965 52 y.o. female    Chief Complaints: Foot hurts    History of Present Illness: Left fifth metatarsal fixation for stress fracture on 3/17/17.  At last visit she had moderate complaints of pain in the foot still has moderate intermittent aching pain in the lateral aspect of the left foot as well as some feelings of burning over the dorsolateral aspect of the foot that had improved to some degree since her last visit.    She has persistent complaints of throbbing aching pain that is nearly constant in her left heel.  We had ordered an MRI at her last visit but she's not yet had it scheduled.    She did not get back into her boot as we discussed at her last visit because she said it made it \"hurt worse\"  HPI    ROS: Foot pain, burning  Past Medical History:   Diagnosis Date   • Asthma    • GERD (gastroesophageal reflux disease)    • High cholesterol    • Metatarsal fracture     LEFT FOOT   • OA (osteoarthritis)    • Osteoporosis    • PONV (postoperative nausea and vomiting)      Physical Exam:   Vitals:    07/03/17 1502   Temp: 97.8 °F (36.6 °C)   TempSrc: Temporal Artery    Weight: 205 lb (93 kg)   Height: 65\" (165.1 cm)     Well developed with normal mood.Left foot and lateral incision is well-healed without sign of infection.  There is some diminished sensation dorsal laterally to this but Tinel's in this area did give her some of the tingling feeling that there was no hyperesthesia or mottling.    Left heel had moderate discomfort with medial lateral No compression as well as discomfort at the insertion of the plantar fascia.      Radiology: CT scan of the left foot was ordered at her last visit to evaluate fracture healing was reviewed do show a small chronic fracture fragment plantar medial to the base of the third metatarsal.  Cannulated screw transfixes the fifth metatarsal fractures with a small segment of dorsal cortex where fracture " remains visualized      Assessment/Plan:  1.  Left fifth metatarsal fracture incomplete healing and small chronic fracture fragment plantar medial base of the third metatarsal.    I don't see anything further to do from an operative standpoint.  She said that her vitamin D was around 53 last time her endocrinologist checked it which was several weeks ago and she takes 5000 units daily.    I want  see whether she qualifies for a bone stimulator and I recommended that she at least continue in a stiff soled athletic shoe for now.    2.  Left foot neuritis.  This has improved slightly but I think she would benefit from some compounding cream and was sent to [facility] for this as it contains ketoprofen gabapentin and lidocaine.  Instructions were provided on administering this.    3.  Persistent left heel pain.  This may be plantar fasciitis but given her history of stress fractures we need to evaluate that.  We'll check to see when she can get an MRI.    If anything worsens she is to get back in her boot otherwise she will call to schedule follow-up appointment once MRI has been scheduled.

## 2017-07-06 ENCOUNTER — OFFICE VISIT (OUTPATIENT)
Dept: ORTHOPEDIC SURGERY | Facility: CLINIC | Age: 52
End: 2017-07-06

## 2017-07-06 DIAGNOSIS — G89.29 HEEL PAIN, CHRONIC, LEFT: ICD-10-CM

## 2017-07-06 DIAGNOSIS — M79.672 HEEL PAIN, CHRONIC, LEFT: ICD-10-CM

## 2017-07-06 DIAGNOSIS — M72.2 PLANTAR FASCIITIS: ICD-10-CM

## 2017-07-06 PROCEDURE — 73721 MRI JNT OF LWR EXTRE W/O DYE: CPT | Performed by: ORTHOPAEDIC SURGERY

## 2017-07-07 ENCOUNTER — TELEPHONE (OUTPATIENT)
Dept: ORTHOPEDIC SURGERY | Facility: CLINIC | Age: 52
End: 2017-07-07

## 2017-07-17 ENCOUNTER — OFFICE VISIT (OUTPATIENT)
Dept: ORTHOPEDIC SURGERY | Facility: CLINIC | Age: 52
End: 2017-07-17

## 2017-07-17 VITALS — HEIGHT: 65 IN | WEIGHT: 206 LBS | TEMPERATURE: 98.9 F | BODY MASS INDEX: 34.32 KG/M2

## 2017-07-17 DIAGNOSIS — M72.2 PLANTAR FASCIITIS: ICD-10-CM

## 2017-07-17 DIAGNOSIS — G89.29 HEEL PAIN, CHRONIC, LEFT: ICD-10-CM

## 2017-07-17 DIAGNOSIS — G57.92 NEURITIS OF FOOT, LEFT: ICD-10-CM

## 2017-07-17 DIAGNOSIS — E55.9 VITAMIN D DEFICIENCY: Primary | ICD-10-CM

## 2017-07-17 DIAGNOSIS — M76.72 PERONEAL TENDONITIS OF LEFT LOWER LEG: ICD-10-CM

## 2017-07-17 DIAGNOSIS — M79.672 HEEL PAIN, CHRONIC, LEFT: ICD-10-CM

## 2017-07-17 DIAGNOSIS — S92.355D CLOSED NONDISPLACED FRACTURE OF FIFTH METATARSAL BONE OF LEFT FOOT WITH ROUTINE HEALING, SUBSEQUENT ENCOUNTER: ICD-10-CM

## 2017-07-17 PROCEDURE — 73630 X-RAY EXAM OF FOOT: CPT | Performed by: ORTHOPAEDIC SURGERY

## 2017-07-17 PROCEDURE — 20551 NJX 1 TENDON ORIGIN/INSJ: CPT | Performed by: ORTHOPAEDIC SURGERY

## 2017-07-17 PROCEDURE — 99214 OFFICE O/P EST MOD 30 MIN: CPT | Performed by: ORTHOPAEDIC SURGERY

## 2017-07-17 RX ORDER — LIDOCAINE HYDROCHLORIDE 10 MG/ML
INJECTION, SOLUTION EPIDURAL; INFILTRATION; INTRACAUDAL; PERINEURAL
Qty: 0.5 ML | Refills: 0
Start: 2017-07-17 | End: 2017-10-26

## 2017-07-17 RX ORDER — BUPIVACAINE HYDROCHLORIDE 5 MG/ML
INJECTION, SOLUTION EPIDURAL; INTRACAUDAL
Qty: 0.5 ML | Refills: 0
Start: 2017-07-17 | End: 2017-10-26

## 2017-07-17 RX ORDER — METHYLPREDNISOLONE ACETATE 80 MG/ML
INJECTION, SUSPENSION INTRA-ARTICULAR; INTRALESIONAL; INTRAMUSCULAR; SOFT TISSUE
Qty: 0.5 ML | Refills: 0
Start: 2017-07-17 | End: 2017-10-26

## 2017-07-17 NOTE — PROGRESS NOTES
"Foot Follow Up      Patient: Tanya L Gowers    YOB: 1965 52 y.o. female    Chief Complaints: Foot still hurts    History of Present Illness: Follows up left fifth metatarsal fixation for stress fracture on 3/17/17.  Still has mild complaints of intermittent aching pain in the lateral aspect of the left foot.  She's also had some feelings of burning in the dorsolateral aspect of her foot that improved some with use of compounding cream.    Also has persistent complaints of throbbing aching pain in her heel and also plantar midfoot area pain is worse upon first step in the morning and does improved some with ambulation.  HPI    ROS: Foot pain, numbness and burning  Past Medical History:   Diagnosis Date   • Asthma    • GERD (gastroesophageal reflux disease)    • High cholesterol    • Metatarsal fracture     LEFT FOOT   • OA (osteoarthritis)    • Osteoporosis    • PONV (postoperative nausea and vomiting)      Physical Exam:   Vitals:    07/17/17 1504   Temp: 98.9 °F (37.2 °C)   Weight: 206 lb (93.4 kg)   Height: 65\" (165.1 cm)     Well developed with normal mood.Nonantalgic gait.  Left foot shows well-healed incision.  Still some diminished sensation lateral aspect of the foot with slight Tinel's over this area but no hyperesthesia or mottling.  Mild discomfort at the insertion of the plantar fascia moderate discomfort in the midfoot.  Mild discomfort to the midfoot with resisted eversion and plantarflexion of the first ray.      Radiology: MRI films and report of the left hindfoot dated 7/6/17 do show some insertional tendinopathy of the peroneus longus at the base of the first metatarsal.  Also chronic thickening of the medial band of the plantar fascia but without significant surrounding inflammation.    3 views of the left foot were ordered to evaluate fifth metatarsal reviewed and compared with x-rays from her last visit.  Really don't see significant interval change compared with previous " "x-rays.  Assessment/Plan:  1.  Left fifth metatarsal fracture with partial delayed healing.  She's currently taking 5000 units of vitamin D daily she's had her vitamin D level was checked by her endocrinologist and it was \"53\".  She's currently the process of getting a bone stimulator.    2.  Left foot neuritis still significant improved and she will continue use of compounding cream.    3.  Left heel pain with plantar fasciitis and insertional peroneus longus tendinitis. She already has stretching exercises that she has been doing.  We'll fit her with a night splint.  She declined any formal therapy.  After verbal consent and sterile preparation the area of the left plantar fascial insertion was injected with 1/2 cc or 0.5 % marcaine NDC # 2197-3627-12 lot# -[id number] exp 1-1-19, 1/2 cc of 1% lidocaine NDC # 8399-7805-38 lot # -[id number] exp 9-1-18, 1/2 cc of depomedrol containing 80mg/cc NDC # 9271-1925-74 lot # [id number],exp 8-01-19     I reviewed her injection risks including local pain inflammation infection and possible tendon rupture.  She understands to limit activity for the next week and use ice as needed.  I'll see her back in 6 weeks' x-rays of her left foot.  "

## 2017-08-28 ENCOUNTER — OFFICE VISIT (OUTPATIENT)
Dept: ORTHOPEDIC SURGERY | Facility: CLINIC | Age: 52
End: 2017-08-28

## 2017-08-28 VITALS — BODY MASS INDEX: 34.32 KG/M2 | WEIGHT: 206 LBS | TEMPERATURE: 98.9 F | HEIGHT: 65 IN

## 2017-08-28 DIAGNOSIS — S92.355D CLOSED NONDISPLACED FRACTURE OF FIFTH METATARSAL BONE OF LEFT FOOT WITH ROUTINE HEALING, SUBSEQUENT ENCOUNTER: Primary | ICD-10-CM

## 2017-08-28 DIAGNOSIS — M72.2 PLANTAR FASCIITIS: ICD-10-CM

## 2017-08-28 DIAGNOSIS — G57.92 NEURITIS OF FOOT, LEFT: ICD-10-CM

## 2017-08-28 DIAGNOSIS — Z98.890 HISTORY OF SURGERY: ICD-10-CM

## 2017-08-28 PROCEDURE — 73630 X-RAY EXAM OF FOOT: CPT | Performed by: ORTHOPAEDIC SURGERY

## 2017-08-28 PROCEDURE — 99214 OFFICE O/P EST MOD 30 MIN: CPT | Performed by: ORTHOPAEDIC SURGERY

## 2017-08-28 NOTE — PROGRESS NOTES
"Foot Follow Up      Patient: Tanya L Gowers    YOB: 1965 52 y.o. female    Chief Complaints: Foot hurts    History of Present Illness: Patient had left fifth metatarsal fracture fixation for stress fracture in March.  Last seen 7/17/17 with persistent complaints of mild intermittent aching pain in the lateral aspect of the forefoot distally. It is mostly throbbing.  She has tried using some orthotics but said it felt like it pushed over on the lateral aspect of her foot too much.  She is using a bone stimulator    The feelings of burning she had in the dorsolateral aspect of her foot and tingling she said her now gone.    At her last visit she also had complaints consistent with plantar fasciitis that is been improved significantly after injection at her last visit and she continues to do maintenance stretching exercises.  HPI    ROS: Foot pain, no burning or tingling  Past Medical History:   Diagnosis Date   • Asthma    • GERD (gastroesophageal reflux disease)    • High cholesterol    • Metatarsal fracture     LEFT FOOT   • OA (osteoarthritis)    • Osteoporosis    • PONV (postoperative nausea and vomiting)      Physical Exam:   Vitals:    08/28/17 1523   Temp: 98.9 °F (37.2 °C)   Weight: 206 lb (93.4 kg)   Height: 65\" (165.1 cm)     Well developed with normal mood.Nonantalgic gait well-healed incision.  No mottling or hyperesthesia of burning or tingling in the lateral aspect of the foot.  There is moderate discomfort along the fifth metatarsal midshaft extending distally.  5 out of 5 eversion strength.  Minimal discomfort insertion plantar fascia.      Radiology: 3 views of the left foot were ordered today to evaluate pain and reviewed these with multiple previous x-rays.  The fracture appears healed but there may be a little questionable area of lucency or even a little questionable area along the hardware.  Distally I don't see any obvious fracture there appears to be a little bit of lucency around " the screws threads distally.      Assessment/Plan:  1.  Left plantar fasciitis seems to have improved with injection she will continue with maintenance stretching.      2.  Left foot neuritis seems to have improved if symptoms worsen she will resume use of compounding cream.  3.  Left lateral foot pain after fifth metatarsal fracture fixation.  I don't have a clear etiology for this little worried about the lucency around the distal aspect of the screw.  She does not exhibit any sign of infection at this time.  I'm not sure that the fracture is completely healed though radiographically it looks good.  Our next step will be to get a CT scan of her left foot to evaluate for healing of the fifth metatarsal stress fracture go from there.    She understands to call to schedule follow-up appointment once MRI has been scheduled.

## 2017-09-07 ENCOUNTER — HOSPITAL ENCOUNTER (OUTPATIENT)
Dept: CT IMAGING | Facility: HOSPITAL | Age: 52
Discharge: HOME | End: 2017-09-07
Attending: ORTHOPAEDIC SURGERY | Admitting: ORTHOPAEDIC SURGERY

## 2017-09-07 DIAGNOSIS — S92.355D CLOSED NONDISPLACED FRACTURE OF FIFTH METATARSAL BONE OF LEFT FOOT WITH ROUTINE HEALING, SUBSEQUENT ENCOUNTER: ICD-10-CM

## 2017-09-07 PROCEDURE — 73700 CT LOWER EXTREMITY W/O DYE: CPT

## 2017-09-13 ENCOUNTER — OFFICE VISIT (OUTPATIENT)
Dept: ORTHOPEDIC SURGERY | Facility: CLINIC | Age: 52
End: 2017-09-13

## 2017-09-13 VITALS — TEMPERATURE: 97.8 F | WEIGHT: 205 LBS | HEIGHT: 65 IN | BODY MASS INDEX: 34.16 KG/M2

## 2017-09-13 DIAGNOSIS — M72.2 PLANTAR FASCIITIS: ICD-10-CM

## 2017-09-13 DIAGNOSIS — M19.079 ARTHRITIS OF FOOT: ICD-10-CM

## 2017-09-13 DIAGNOSIS — S92.355D CLOSED NONDISPLACED FRACTURE OF FIFTH METATARSAL BONE OF LEFT FOOT WITH ROUTINE HEALING, SUBSEQUENT ENCOUNTER: ICD-10-CM

## 2017-09-13 DIAGNOSIS — G57.92 NEURITIS OF FOOT, LEFT: Primary | ICD-10-CM

## 2017-09-13 PROCEDURE — 99213 OFFICE O/P EST LOW 20 MIN: CPT | Performed by: ORTHOPAEDIC SURGERY

## 2017-09-13 RX ORDER — MELOXICAM 15 MG/1
TABLET ORAL
Qty: 30 TABLET | Refills: 1 | Status: SHIPPED | OUTPATIENT
Start: 2017-09-13

## 2017-09-13 RX ORDER — NAPROXEN 500 MG/1
TABLET ORAL
COMMUNITY
Start: 2017-09-06 | End: 2017-09-13

## 2017-09-13 NOTE — PROGRESS NOTES
"Foot Follow Up      Patient: Tanya L Gowers    YOB: 1965 52 y.o. female    Chief Complaints: Foot toe hurts    History of Present Illness: Left fifth metatarsal fracture fixation for stress fracture last seen on 8/28/17 with persistent complaints of mild intermittent aching pain that begins in the dorsolateral aspect of the midfoot and sometimes goes down under her forefoot under her metatarsal heads.      Burning symptoms that she had she said her essentially gone now and the heel pain is markedly improved she's been stretching about 3 or 4 times a day.  She's not currently been using orthotics and can membership she has one with a metatarsal pad or not.    HPI    ROS: Foot pain  Past Medical History:   Diagnosis Date   • Asthma    • GERD (gastroesophageal reflux disease)    • High cholesterol    • Metatarsal fracture     LEFT FOOT   • OA (osteoarthritis)    • Osteoporosis    • PONV (postoperative nausea and vomiting)      Physical Exam:   Vitals:    09/13/17 1133   Temp: 97.8 °F (36.6 °C)   TempSrc: Temporal Artery    Weight: 205 lb (93 kg)   Height: 65\" (165.1 cm)     Well developed with normal mood.Nonantalgic gait.  Left foot showed no mottling or hyperesthesia.  She was grossly sensate light touch throughout the foot.  Minimal discomfort the insertion of the plantar fascia.  There was mild to moderate discomfort over the dorsolateral midfoot along the third fourth and fifth TMT joints as well as some beneath metatarsal heads but no ulceration or callus.      Radiology: CT scan of left foot dated 9/7/17 was reviewed shows healed fracture of the fifth metatarsal screw appears intact throughout the small ossification adjacent to the base the fifth metatarsal distally the peroneus brevis insertion and chronic fracture fragment plantar medial to the base of the third metatarsal without change.      Assessment/Plan:  1.  Status post left fifth metatarsal fracture fixation  2.  Left foot neuritis seems " to have improved if it worsens we'll consider compounding cream  3.  Left foot plantar fasciitis continue with stretching exercises this is not terribly bothersome.    The main thing is most bothersome to her as the pain in the dorsolateral aspect of the foot that radiates down the foot.  This may be some arthritic change from the third TMT joint injury as well as some changes to the fourth and fifth TMT joint.    She takes Naprosyn for her back and said some days it helps her foot some days it doesn't.  We'll have her stop this and change to meloxicam 15 g 1 by mouth daily with GI precautions.    She's going to check her orthotic when she gets home to  see if it has a metatarsal pad if it doesn't she'll let me know and we'll send her to [facility] for some.  I recommend she continue stretching at least 4 times a day.    We also discussed radiograph the guided injections in the midfoot from a diagnostic and therapeutic standpoint which she declined at this time.    I'll see her back in 6 weeks' x-rays of her left foot

## 2017-10-26 ENCOUNTER — OFFICE VISIT (OUTPATIENT)
Dept: ORTHOPEDIC SURGERY | Facility: CLINIC | Age: 52
End: 2017-10-26

## 2017-10-26 VITALS — WEIGHT: 205 LBS | BODY MASS INDEX: 34.16 KG/M2 | TEMPERATURE: 97.7 F | HEIGHT: 65 IN

## 2017-10-26 DIAGNOSIS — M77.42 METATARSALGIA OF LEFT FOOT: ICD-10-CM

## 2017-10-26 DIAGNOSIS — M72.2 PLANTAR FASCIITIS: ICD-10-CM

## 2017-10-26 DIAGNOSIS — S92.355D CLOSED NONDISPLACED FRACTURE OF FIFTH METATARSAL BONE OF LEFT FOOT WITH ROUTINE HEALING, SUBSEQUENT ENCOUNTER: Primary | ICD-10-CM

## 2017-10-26 PROCEDURE — 73630 X-RAY EXAM OF FOOT: CPT | Performed by: ORTHOPAEDIC SURGERY

## 2017-10-26 PROCEDURE — 99213 OFFICE O/P EST LOW 20 MIN: CPT | Performed by: ORTHOPAEDIC SURGERY

## 2017-10-26 NOTE — PROGRESS NOTES
"Foot Follow Up      Patient: Tanya L Gowers    YOB: 1965 52 y.o. female    Chief Complaints: Foot about the same    History of Present Illness: Patient had left fifth metatarsal fracture fixation for stress fracture in March of this year.  She was last seen on 9/13/17 at that time she had pain in the dorsolateral aspect of her midfoot and some time under the forefoot.  However now pain mainly seems isolated over the dorsal aspect of the fourth metatarsal in the forefoot worse with prolonged walking and standing.    She's not having the burning symptoms that she was having previously and has been doing her stretching 3 or 4 times a day.  Her heel pain has slightly improved Orthotic does have a metatarsal pad and it.    Previous CT shows that the fracture the fifth metatarsal was healed there was a small ossification adjacent to the base of the fifth metatarsal  Just just below the peroneal brevis insertion. There  was also a chronic fracture fragment plantar medial to the base of the third metatarsal.  HPI    ROS: Foot pain  Past Medical History:   Diagnosis Date   • Asthma    • GERD (gastroesophageal reflux disease)    • High cholesterol    • Metatarsal fracture     LEFT FOOT   • OA (osteoarthritis)    • Osteoporosis    • PONV (postoperative nausea and vomiting)      Physical Exam:   Vitals:    10/26/17 1526   Temp: 97.7 °F (36.5 °C)   TempSrc: Temporal Artery    Weight: 205 lb (93 kg)   Height: 65\" (165.1 cm)     Well developed with normal mood.Exam showed a nonantalgic gait her incision is well-healed.  There is really no focal pain along the inferolateral aspect of the left hindfoot.  Minimal discomfort along the fifth metatarsal.  Moderate discomfort along the fourth metatarsal in the forefoot but no irritability to the fourth MTP joint.  Really could not elicit significant pain at the second third or fourth TMT joint today with palpation or manipulation.  Mild discomfort at the insertion of the " plantar fascia      Radiology: 3 views of the left foot were ordered to evaluate pain reviewed and compared with previous x-rays.  Fifth metatarsal fracture appears healed hardware is well contained.      Assessment/Plan:  1.  Status post left fifth metatarsal fracture fixation  2.  Left third tarsal base fracture  3.  Foot plantar fasciitis  4.  Left forefoot metatarsalgia along the fourth metatarsal.       she continues to take Naprosyn and do stretching exercises and use her orthotic with metatarsal pad and it.    Her plantar fasciitis and fifth metatarsal seem to have improved some as has her neuritis.  I still can't clearly explain the pain that is now localizing along the fourth metatarsal.    We'll going to get an MRI of this area to make sure that there is not a stress fracture.    At this point the third metatarsal base fracture does not seem to be symptomatically at the TMT joint but we may consider radiographically guided injection of this area from a diagnostic and therapeutic standpoint.  If we don't see anything else obvious on her MRI     She understands to call to schedule follow-up point once MRI has been scheduled

## 2017-11-09 ENCOUNTER — HOSPITAL ENCOUNTER (OUTPATIENT)
Dept: MRI IMAGING | Facility: HOSPITAL | Age: 52
Discharge: HOME | End: 2017-11-09
Attending: ORTHOPAEDIC SURGERY | Admitting: ORTHOPAEDIC SURGERY

## 2017-11-09 DIAGNOSIS — M77.42 METATARSALGIA OF LEFT FOOT: ICD-10-CM

## 2017-11-09 PROCEDURE — 73718 MRI LOWER EXTREMITY W/O DYE: CPT

## 2017-11-20 ENCOUNTER — OFFICE VISIT (OUTPATIENT)
Dept: ORTHOPEDIC SURGERY | Facility: CLINIC | Age: 52
End: 2017-11-20

## 2017-11-20 VITALS — HEIGHT: 65 IN | BODY MASS INDEX: 33.82 KG/M2 | TEMPERATURE: 98.6 F | WEIGHT: 203 LBS

## 2017-11-20 DIAGNOSIS — S92.355D CLOSED NONDISPLACED FRACTURE OF FIFTH METATARSAL BONE OF LEFT FOOT WITH ROUTINE HEALING, SUBSEQUENT ENCOUNTER: ICD-10-CM

## 2017-11-20 DIAGNOSIS — M19.079 ARTHRITIS OF FOOT: Primary | ICD-10-CM

## 2017-11-20 PROCEDURE — 99213 OFFICE O/P EST LOW 20 MIN: CPT | Performed by: ORTHOPAEDIC SURGERY

## 2017-11-20 NOTE — PROGRESS NOTES
"Foot Follow Up      Patient: Tanya L Gowers    YOB: 1965 52 y.o. female    Chief Complaints: Foot pain    History of Present Illness: Foot doing okay.  She had fifth metatarsal fracture fixation for stress fracture in March.  She has minimal complaints of intermittent aching pain in the lateral aspect of her foot.    She's now had persistent complaints of intermittent moderate aching pain mainly over the dorsum of the midfoot with prolonged walking and standing is been going on for at least 6 or 8 weeks.  She no longer having in  burning symptoms that she was having previously.  her heel pain has improved with use of orthotics with metatarsal pad  HPI    ROS: Foot pain  Past Medical History:   Diagnosis Date   • Asthma    • GERD (gastroesophageal reflux disease)    • High cholesterol    • Metatarsal fracture     LEFT FOOT   • OA (osteoarthritis)    • Osteoporosis    • PONV (postoperative nausea and vomiting)      Physical Exam:   Vitals:    11/20/17 1414   Temp: 98.6 °F (37 °C)   Weight: 203 lb (92.1 kg)   Height: 65\" (165.1 cm)     Well developed with normal mood.Nonantalgic gait.  Mild discomfort over the dorsum of the left midfoot mainly around the third TMT joints.  Really no focal increase in pain with patient he testing.  Mental discomfort along the fifth metatarsal.  5 out of 5 eversion strength.      Radiology: MRI films and report of the left foot dated 11/9/17 shows a tiny chronic calcification within her adjacent to the peroneus brevis tendon insertion.  Also shows midfoot arthritis with marrow edema the plantar lateral aspect of the second TMT joint and 1 centimeter reactive bone marrow edema within the base the third metatarsal.  Previous CT scan to that area and show plantar medial small fracture fragment that had not healed.      Assessment/Plan:  1.  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  2.  Fifth metatarsal fracture appears healed on MRI and the majority " of it appeared to have healed on the previous CT scan.  3.  Mild chronic degenerative changes the first MTP joint.    We have discussed treatment options and nothing I would recommend from a surgical standpoint she'll continue with orthotics.  She'll continue with stretching exercises.    We'll also have radiographically guided steroid injections done and left second and third TMT joint for diagnostic and therapeutic standpoint for possible fusion in the future.    I will see her back in 2 months x-rays of her left foot

## 2017-11-27 ENCOUNTER — HOSPITAL ENCOUNTER (OUTPATIENT)
Dept: GENERAL RADIOLOGY | Facility: HOSPITAL | Age: 52
Discharge: HOME | End: 2017-11-27
Attending: ORTHOPAEDIC SURGERY | Admitting: ORTHOPAEDIC SURGERY

## 2017-11-27 DIAGNOSIS — M19.079 ARTHRITIS OF FOOT: ICD-10-CM

## 2017-11-27 PROCEDURE — 0 IOPAMIDOL 61 % SOLUTION: Performed by: RADIOLOGY

## 2017-11-27 PROCEDURE — 25010000002 METHYLPREDNISOLONE PER 40 MG: Performed by: RADIOLOGY

## 2017-11-27 PROCEDURE — 77002 NEEDLE LOCALIZATION BY XRAY: CPT

## 2017-11-27 RX ORDER — METHYLPREDNISOLONE ACETATE 40 MG/ML
40 INJECTION, SUSPENSION INTRA-ARTICULAR; INTRALESIONAL; INTRAMUSCULAR; SOFT TISSUE ONCE
Status: COMPLETED | OUTPATIENT
Start: 2017-11-27 | End: 2017-11-27

## 2017-11-27 RX ORDER — LIDOCAINE HYDROCHLORIDE 10 MG/ML
10 INJECTION, SOLUTION INFILTRATION; PERINEURAL ONCE
Status: COMPLETED | OUTPATIENT
Start: 2017-11-27 | End: 2017-11-27

## 2017-11-27 RX ORDER — BUPIVACAINE HYDROCHLORIDE 2.5 MG/ML
3 INJECTION, SOLUTION EPIDURAL; INFILTRATION; INTRACAUDAL ONCE
Status: COMPLETED | OUTPATIENT
Start: 2017-11-27 | End: 2017-11-27

## 2017-11-27 RX ADMIN — METHYLPREDNISOLONE ACETATE 40 MG: 40 INJECTION, SUSPENSION INTRA-ARTICULAR; INTRALESIONAL; INTRAMUSCULAR; SOFT TISSUE at 12:17

## 2017-11-27 RX ADMIN — IOPAMIDOL 3 ML: 612 INJECTION, SOLUTION INTRAVENOUS at 12:16

## 2017-11-27 RX ADMIN — BUPIVACAINE HYDROCHLORIDE 3 ML: 2.5 INJECTION, SOLUTION EPIDURAL; INFILTRATION; INTRACAUDAL; PERINEURAL at 12:26

## 2017-11-27 RX ADMIN — LIDOCAINE HYDROCHLORIDE 3 ML: 10 INJECTION, SOLUTION INFILTRATION; PERINEURAL at 12:14

## 2018-01-23 ENCOUNTER — HOSPITAL ENCOUNTER (OUTPATIENT)
Dept: GENERAL RADIOLOGY | Facility: HOSPITAL | Age: 53
Discharge: HOME | End: 2018-01-23
Attending: ALLERGY & IMMUNOLOGY | Admitting: ALLERGY & IMMUNOLOGY

## 2018-01-23 DIAGNOSIS — J18.9 PNEUMONIA: ICD-10-CM

## 2018-01-23 PROCEDURE — 71046 X-RAY EXAM CHEST 2 VIEWS: CPT
